# Patient Record
Sex: FEMALE | Race: WHITE | NOT HISPANIC OR LATINO | Employment: UNEMPLOYED | URBAN - METROPOLITAN AREA
[De-identification: names, ages, dates, MRNs, and addresses within clinical notes are randomized per-mention and may not be internally consistent; named-entity substitution may affect disease eponyms.]

---

## 2017-03-14 DIAGNOSIS — K76.89 AUTOIMMUNE DISEASE OF LIVER: ICD-10-CM

## 2017-03-14 RX ORDER — AZATHIOPRINE 50 MG/1
50 TABLET ORAL DAILY
Qty: 30 TABLET | Refills: 5 | Status: SHIPPED | OUTPATIENT
Start: 2017-03-14 | End: 2018-06-07 | Stop reason: SDUPTHER

## 2017-05-18 ENCOUNTER — TELEPHONE (OUTPATIENT)
Dept: INTERNAL MEDICINE | Facility: CLINIC | Age: 27
End: 2017-05-18

## 2017-05-18 NOTE — TELEPHONE ENCOUNTER
Left message advising pt that she still have refills remaining at [facility]'s pharmacy.

## 2017-05-18 NOTE — TELEPHONE ENCOUNTER
----- Message from Fabian Denis sent at 5/17/2017  4:01 PM CDT -----  Contact: Self [phone]  Type: Rx    Name of medication(s): azathioprine (IMURAN) 50 mg Tab     Is this a refill? New rx? Refill    Who prescribed medication?      Pharmacy Name, Phone, & Location:    Comments: Pt is requesting that you please send the Rx to Fax  [phone]    Nurses station #[phone], stated that if you need to get in touch with her to please call her @ the nurses station #, advice    Thanks

## 2017-05-25 ENCOUNTER — TELEPHONE (OUTPATIENT)
Dept: INTERNAL MEDICINE | Facility: CLINIC | Age: 27
End: 2017-05-25

## 2017-05-25 NOTE — TELEPHONE ENCOUNTER
pt has not seen me in more than a year - please set up office visit.  Also, she needs to see hepatology at least annually - have her call them to set up ov - does not need a referral form me as she has already seen them.    Re: blood tests: if these are labs her doc [place] wanted, yes I cna order. Otherwise may be better to see hepatology so all labs they want can be done at same time

## 2017-05-25 NOTE — TELEPHONE ENCOUNTER
----- Message from María Blanco MA sent at 5/25/2017  7:52 AM CDT -----  Contact: Amber kaur/[facility]-[phone]  Type: Orders Request    What orders/ testing are being requested? Labs (CBC, Electrolytes and Iron)    Is there a future appointment scheduled for the patient with PCP? No    When?    Comments: Please advise and call Amber. Thanks!

## 2018-06-07 DIAGNOSIS — K76.89 AUTOIMMUNE DISEASE OF LIVER: ICD-10-CM

## 2018-06-07 RX ORDER — AZATHIOPRINE 50 MG/1
TABLET ORAL
Qty: 30 TABLET | Refills: 0 | Status: SHIPPED | OUTPATIENT
Start: 2018-06-07 | End: 2022-08-26

## 2018-08-27 ENCOUNTER — HOSPITAL ENCOUNTER (EMERGENCY)
Facility: OTHER | Age: 28
Discharge: HOME | End: 2018-08-27
Attending: EMERGENCY MEDICINE
Payer: COMMERCIAL

## 2018-08-27 VITALS
WEIGHT: 123.44 LBS | OXYGEN SATURATION: 100 % | DIASTOLIC BLOOD PRESSURE: 81 MMHG | RESPIRATION RATE: 16 BRPM | BODY MASS INDEX: 19.84 KG/M2 | HEART RATE: 85 BPM | SYSTOLIC BLOOD PRESSURE: 119 MMHG | HEIGHT: 66 IN | TEMPERATURE: 98 F

## 2018-08-27 DIAGNOSIS — F10.10 ALCOHOL ABUSE: Primary | ICD-10-CM

## 2018-08-27 LAB
ALBUMIN SERPL BCP-MCNC: 4.1 G/DL
ALP SERPL-CCNC: 165 U/L
ALT SERPL W/O P-5'-P-CCNC: 143 U/L
ANION GAP SERPL CALC-SCNC: 13 MMOL/L
AST SERPL-CCNC: 327 U/L
B-HCG UR QL: NEGATIVE
BACTERIA #/AREA URNS HPF: NORMAL /HPF
BASOPHILS # BLD AUTO: 0.02 K/UL
BASOPHILS NFR BLD: 0.4 %
BILIRUB SERPL-MCNC: 0.5 MG/DL
BILIRUB UR QL STRIP: NEGATIVE
BUN SERPL-MCNC: 15 MG/DL
CALCIUM SERPL-MCNC: 9.7 MG/DL
CHLORIDE SERPL-SCNC: 102 MMOL/L
CLARITY UR: CLEAR
CO2 SERPL-SCNC: 28 MMOL/L
COLOR UR: YELLOW
CREAT SERPL-MCNC: 0.8 MG/DL
CTP QC/QA: YES
DIFFERENTIAL METHOD: ABNORMAL
EOSINOPHIL # BLD AUTO: 0 K/UL
EOSINOPHIL NFR BLD: 0.6 %
ERYTHROCYTE [DISTWIDTH] IN BLOOD BY AUTOMATED COUNT: 12.4 %
EST. GFR  (AFRICAN AMERICAN): >60 ML/MIN/1.73 M^2
EST. GFR  (NON AFRICAN AMERICAN): >60 ML/MIN/1.73 M^2
GLUCOSE SERPL-MCNC: 129 MG/DL
GLUCOSE UR QL STRIP: NEGATIVE
HCT VFR BLD AUTO: 38 %
HGB BLD-MCNC: 12.7 G/DL
HGB UR QL STRIP: ABNORMAL
HYALINE CASTS #/AREA URNS LPF: 1 /LPF
KETONES UR QL STRIP: NEGATIVE
LEUKOCYTE ESTERASE UR QL STRIP: NEGATIVE
LYMPHOCYTES # BLD AUTO: 1.9 K/UL
LYMPHOCYTES NFR BLD: 37.9 %
MCH RBC QN AUTO: 31.3 PG
MCHC RBC AUTO-ENTMCNC: 33.4 G/DL
MCV RBC AUTO: 94 FL
MICROSCOPIC COMMENT: NORMAL
MONOCYTES # BLD AUTO: 0.2 K/UL
MONOCYTES NFR BLD: 3.7 %
NEUTROPHILS # BLD AUTO: 2.9 K/UL
NEUTROPHILS NFR BLD: 57.2 %
NITRITE UR QL STRIP: NEGATIVE
PH UR STRIP: 6 [PH] (ref 5–8)
PLATELET # BLD AUTO: 113 K/UL
PMV BLD AUTO: 8.5 FL
POTASSIUM SERPL-SCNC: 4.1 MMOL/L
PROT SERPL-MCNC: 7.4 G/DL
PROT UR QL STRIP: NEGATIVE
RBC # BLD AUTO: 4.06 M/UL
RBC #/AREA URNS HPF: 1 /HPF (ref 0–4)
SODIUM SERPL-SCNC: 143 MMOL/L
SP GR UR STRIP: 1.01 (ref 1–1.03)
SQUAMOUS #/AREA URNS HPF: 2 /HPF
URN SPEC COLLECT METH UR: ABNORMAL
UROBILINOGEN UR STRIP-ACNC: NEGATIVE EU/DL
WBC # BLD AUTO: 5.09 K/UL

## 2018-08-27 PROCEDURE — 80053 COMPREHEN METABOLIC PANEL: CPT

## 2018-08-27 PROCEDURE — 96360 HYDRATION IV INFUSION INIT: CPT

## 2018-08-27 PROCEDURE — 81000 URINALYSIS NONAUTO W/SCOPE: CPT

## 2018-08-27 PROCEDURE — 85025 COMPLETE CBC W/AUTO DIFF WBC: CPT

## 2018-08-27 PROCEDURE — 25000003 PHARM REV CODE 250: Performed by: EMERGENCY MEDICINE

## 2018-08-27 PROCEDURE — 99283 EMERGENCY DEPT VISIT LOW MDM: CPT | Mod: 25

## 2018-08-27 PROCEDURE — 96361 HYDRATE IV INFUSION ADD-ON: CPT

## 2018-08-27 PROCEDURE — 81025 URINE PREGNANCY TEST: CPT | Performed by: EMERGENCY MEDICINE

## 2018-08-27 RX ADMIN — SODIUM CHLORIDE 1000 ML: 0.9 INJECTION, SOLUTION INTRAVENOUS at 01:08

## 2018-08-27 RX ADMIN — SODIUM CHLORIDE 1000 ML: 0.9 INJECTION, SOLUTION INTRAVENOUS at 02:08

## 2018-08-27 NOTE — ED TRIAGE NOTES
Pt presents with friend after drinking heavily for 3 days. Reports recent d/c from rehab for eating disorder and ETOH abuse. Pt reports she is just getting off a stretch of heavy drinking with a fall 4 days ago hitting head. Friend reports that pt has called suicide hotline 2 times and pt states it was just for support to talk to someone but she never had any SI or feeling of wanting to hurt herself. Pt reports she hasn't ate over the last few days.
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
## 2018-11-02 ENCOUNTER — HOSPITAL ENCOUNTER (EMERGENCY)
Facility: HOSPITAL | Age: 28
Discharge: HOME | End: 2018-11-02
Attending: EMERGENCY MEDICINE
Payer: COMMERCIAL

## 2018-11-02 VITALS
DIASTOLIC BLOOD PRESSURE: 71 MMHG | RESPIRATION RATE: 18 BRPM | OXYGEN SATURATION: 98 % | BODY MASS INDEX: 19.96 KG/M2 | HEART RATE: 98 BPM | HEIGHT: 67 IN | SYSTOLIC BLOOD PRESSURE: 111 MMHG | WEIGHT: 127.19 LBS | TEMPERATURE: 99 F

## 2018-11-02 DIAGNOSIS — F10.10 ALCOHOL ABUSE: Primary | ICD-10-CM

## 2018-11-02 LAB
ALBUMIN SERPL BCP-MCNC: 4.3 G/DL
ALP SERPL-CCNC: 264 U/L
ALT SERPL W/O P-5'-P-CCNC: 102 U/L
ANION GAP SERPL CALC-SCNC: 22 MMOL/L
AST SERPL-CCNC: 187 U/L
B-HCG UR QL: NEGATIVE
BASOPHILS # BLD AUTO: 0.05 K/UL
BASOPHILS NFR BLD: 0.5 %
BILIRUB SERPL-MCNC: 1.6 MG/DL
BUN SERPL-MCNC: 17 MG/DL
CALCIUM SERPL-MCNC: 9.9 MG/DL
CHLORIDE SERPL-SCNC: 88 MMOL/L
CO2 SERPL-SCNC: 25 MMOL/L
CREAT SERPL-MCNC: 0.7 MG/DL
CTP QC/QA: YES
DIFFERENTIAL METHOD: ABNORMAL
EOSINOPHIL # BLD AUTO: 0.1 K/UL
EOSINOPHIL NFR BLD: 0.8 %
ERYTHROCYTE [DISTWIDTH] IN BLOOD BY AUTOMATED COUNT: 15.5 %
EST. GFR  (AFRICAN AMERICAN): >60 ML/MIN/1.73 M^2
EST. GFR  (NON AFRICAN AMERICAN): >60 ML/MIN/1.73 M^2
ETHANOL SERPL-MCNC: 110 MG/DL
GLUCOSE SERPL-MCNC: 51 MG/DL
HCT VFR BLD AUTO: 36.1 %
HGB BLD-MCNC: 12 G/DL
IMM GRANULOCYTES # BLD AUTO: 0.06 K/UL
IMM GRANULOCYTES NFR BLD AUTO: 0.7 %
INR PPP: 1
LYMPHOCYTES # BLD AUTO: 1.6 K/UL
LYMPHOCYTES NFR BLD: 17 %
MCH RBC QN AUTO: 30.8 PG
MCHC RBC AUTO-ENTMCNC: 33.2 G/DL
MCV RBC AUTO: 93 FL
MONOCYTES # BLD AUTO: 0.6 K/UL
MONOCYTES NFR BLD: 7 %
NEUTROPHILS # BLD AUTO: 6.8 K/UL
NEUTROPHILS NFR BLD: 74 %
NRBC BLD-RTO: 0 /100 WBC
PLATELET # BLD AUTO: 145 K/UL
PMV BLD AUTO: 9.8 FL
POTASSIUM SERPL-SCNC: 2.8 MMOL/L
PROT SERPL-MCNC: 7.9 G/DL
PROTHROMBIN TIME: 10.1 SEC
RBC # BLD AUTO: 3.9 M/UL
SODIUM SERPL-SCNC: 135 MMOL/L
WBC # BLD AUTO: 9.17 K/UL

## 2018-11-02 PROCEDURE — 80320 DRUG SCREEN QUANTALCOHOLS: CPT

## 2018-11-02 PROCEDURE — 99284 EMERGENCY DEPT VISIT MOD MDM: CPT | Mod: ,,, | Performed by: EMERGENCY MEDICINE

## 2018-11-02 PROCEDURE — 99284 EMERGENCY DEPT VISIT MOD MDM: CPT | Mod: 25

## 2018-11-02 PROCEDURE — 93010 ELECTROCARDIOGRAM REPORT: CPT | Mod: ,,, | Performed by: INTERNAL MEDICINE

## 2018-11-02 PROCEDURE — 80053 COMPREHEN METABOLIC PANEL: CPT

## 2018-11-02 PROCEDURE — 85025 COMPLETE CBC W/AUTO DIFF WBC: CPT

## 2018-11-02 PROCEDURE — 85610 PROTHROMBIN TIME: CPT

## 2018-11-02 PROCEDURE — 25000003 PHARM REV CODE 250: Performed by: EMERGENCY MEDICINE

## 2018-11-02 PROCEDURE — 96360 HYDRATION IV INFUSION INIT: CPT

## 2018-11-02 PROCEDURE — 96361 HYDRATE IV INFUSION ADD-ON: CPT

## 2018-11-02 PROCEDURE — 81025 URINE PREGNANCY TEST: CPT | Performed by: EMERGENCY MEDICINE

## 2018-11-02 PROCEDURE — 93005 ELECTROCARDIOGRAM TRACING: CPT

## 2018-11-02 RX ORDER — GABAPENTIN 300 MG/1
300 CAPSULE ORAL 3 TIMES DAILY
COMMUNITY
End: 2022-08-25

## 2018-11-02 RX ORDER — POTASSIUM CHLORIDE 20 MEQ/1
40 TABLET, EXTENDED RELEASE ORAL
Status: COMPLETED | OUTPATIENT
Start: 2018-11-02 | End: 2018-11-02

## 2018-11-02 RX ORDER — SODIUM CHLORIDE 9 MG/ML
1000 INJECTION, SOLUTION INTRAVENOUS
Status: COMPLETED | OUTPATIENT
Start: 2018-11-02 | End: 2018-11-02

## 2018-11-02 RX ORDER — POTASSIUM CHLORIDE 20 MEQ/15ML
40 SOLUTION ORAL
Status: DISCONTINUED | OUTPATIENT
Start: 2018-11-02 | End: 2018-11-02

## 2018-11-02 RX ADMIN — POTASSIUM CHLORIDE 40 MEQ: 1500 TABLET, EXTENDED RELEASE ORAL at 03:11

## 2018-11-02 RX ADMIN — SODIUM CHLORIDE 1000 ML: 0.9 INJECTION, SOLUTION INTRAVENOUS at 02:11

## 2018-11-02 RX ADMIN — POTASSIUM CHLORIDE 40 MEQ: 1500 TABLET, EXTENDED RELEASE ORAL at 04:11

## 2018-11-02 RX ADMIN — SODIUM CHLORIDE 1000 ML: 0.9 INJECTION, SOLUTION INTRAVENOUS at 03:11

## 2018-11-02 NOTE — DISCHARGE INSTRUCTIONS
Follow up with your doctor and your liver doctor. Call Monday for an appointment.  Return to ED for vomiting, abdominal pain or any other concerns.

## 2018-11-02 NOTE — ED TRIAGE NOTES
"Pt. Presents to ED today with c/o possible withdrawal from alcohol. Usually drinks 1-1.5 pints of whiskey daily, ran out of alcohol last night, last drink around 2100. Pt. Denies going through withdrawals before. Pt. States feeling like "she's going to pass out", states n/v, subjective tremors. Denies cp, sob, fevers. No other acute distress noted at this time.   "

## 2018-11-02 NOTE — ED NOTES
Pt. Denies questions or concerns regarding discharge instructions or fu. No acute distress noted. Ambulatory with steady gait to lobby with family.

## 2018-11-02 NOTE — ED PROVIDER NOTES
"Encounter Date: 11/2/2018    SCRIBE #1 NOTE: I, Maryellen Boateng, am scribing for, and in the presence of,  Dr. Choi. I have scribed the entire note.       History     Chief Complaint   Patient presents with    Alcohol Problem     Pt states she is an alcoholic who fell off the wagon yesterday. Her last drink was yesterday morning at approximately 1000 and she feels like she is going into DT's.      Time patient was seen by the provider: 2:09 PM      The patient is a 28 y.o. female with co-morbidities including: auto immune neutropenia, autoimmune hepatitis, anxiety, depression, who presents to the ED with a complaint of alcohol abuse. The patient reports she "fell off the wagon" yesterday. She states she drinks about 1 pint of whiskey. Her last drink was around 10:00 PM last night. She notes of SOB, nausea. Pt has been in and out of rehab for the past year. She also reports of having multiple trip and falls while intoxicated. Denies chest pain, vomiting, diarrhea.   Pt denies suicidal thoughts/ideations.      The history is provided by the patient, medical records and a parent.     Review of patient's allergies indicates:  No Known Allergies  Past Medical History:   Diagnosis Date    Anxiety     Auto immune neutropenia     Autoimmune hepatitis     Depression      No past surgical history on file.  Family History   Problem Relation Age of Onset    Depression Mother     Hyperlipidemia Mother     Alcohol abuse Father     Cancer Father     Depression Father     Depression Sister     Depression Brother     Cancer Maternal Aunt     Depression Maternal Grandfather     Liver disease Maternal Grandfather     ALS Maternal Grandfather     Depression Paternal Grandmother     Cancer Paternal Grandfather     Depression Paternal Grandfather     Depression Brother      Social History     Tobacco Use    Smoking status: Current Some Day Smoker   Substance Use Topics    Alcohol use: Yes    Drug use: Yes     " Types: Marijuana     Review of Systems   Constitutional: Negative for fever.   HENT: Negative for sore throat.    Respiratory: Positive for shortness of breath.    Cardiovascular: Negative for chest pain.   Gastrointestinal: Positive for nausea. Negative for diarrhea and vomiting.   Genitourinary: Negative for dysuria.   Musculoskeletal: Negative for back pain.   Skin: Negative for rash.   Neurological: Negative for weakness.   Hematological: Does not bruise/bleed easily.   Psychiatric/Behavioral: Negative for self-injury and suicidal ideas.       Physical Exam     Initial Vitals [11/02/18 1329]   BP Pulse Resp Temp SpO2   126/70 (!) 119 19 98.7 °F (37.1 °C) 98 %      MAP       --         Physical Exam    Nursing note and vitals reviewed.  Constitutional: She appears well-developed and well-nourished. No distress.   HENT:   Head: Normocephalic and atraumatic.   Eyes: EOM are normal. Pupils are equal, round, and reactive to light.   Neck: Neck supple.   Cardiovascular: Normal rate, regular rhythm, normal heart sounds and intact distal pulses.   Pulmonary/Chest: Breath sounds normal. No respiratory distress.   Abdominal: Soft. Bowel sounds are normal. She exhibits no distension. There is no tenderness. There is no rebound and no guarding.   Musculoskeletal: She exhibits no edema or tenderness.   Neurological: She is alert and oriented to person, place, and time.   Skin: Skin is warm and dry.   Scattered bruises on the right forearm. Bruise to the left hip.   Psychiatric: She has a normal mood and affect.         ED Course   Procedures  Labs Reviewed   CBC W/ AUTO DIFFERENTIAL - Abnormal; Notable for the following components:       Result Value    RBC 3.90 (*)     Hematocrit 36.1 (*)     RDW 15.5 (*)     Platelets 145 (*)     Immature Granulocytes 0.7 (*)     Immature Grans (Abs) 0.06 (*)     Gran% 74.0 (*)     Lymph% 17.0 (*)     All other components within normal limits    Narrative:     ONE GREEN SHARED    COMPREHENSIVE METABOLIC PANEL - Abnormal; Notable for the following components:    Sodium 135 (*)     Potassium 2.8 (*)     Chloride 88 (*)     Glucose 51 (*)     Total Bilirubin 1.6 (*)     Alkaline Phosphatase 264 (*)      (*)      (*)     Anion Gap 22 (*)     All other components within normal limits    Narrative:     ONE GREEN SHARED   ALCOHOL,MEDICAL (ETHANOL) - Abnormal; Notable for the following components:    Alcohol, Medical, Serum 110 (*)     All other components within normal limits    Narrative:     ONE GREEN SHARED   PROTIME-INR    Narrative:     ONE GREEN SHARED   POCT URINE PREGNANCY     EKG Readings: (Independently Interpreted)   Initial Reading: No STEMI. Rhythm: Normal Sinus Rhythm. Heart Rate: 90.   No acute ischemic changes.       Imaging Results    None          Medical Decision Making:   History:   Old Medical Records: I decided to obtain old medical records.  Initial Assessment:   29 y/o F presents with concern for alcohol withdrawal and worsening liver function- admits to non-compliance with autoimmune hepatitis liver disease.  Pt has history of eating disorder and self harm. Pt denies suicidal thoughts. She is here with her father whom has a plan to take her to rehabd from ED. Will hydrate and check blood work. No clinical signs of alcohol at this time.  Independently Interpreted Test(s):   I have ordered and independently interpreted EKG Reading(s) - see prior notes  Clinical Tests:   Lab Tests: Ordered and Reviewed  Medical Tests: Ordered and Reviewed            Scribe Attestation:   Scribe #1: I performed the above scribed service and the documentation accurately describes the services I performed. I attest to the accuracy of the note.    Attending Attestation:             Attending ED Notes:   4:04 PM  EtOH 110  Slight increase in lfts. No indication for emergent admission. No clinical signs of withdrawal. Pt and father provided with numbers for alcohol abuse resources as  well as number for [facility] which father states he is familiar with. Discharge home. Follow up with PCP as well as hepatology.             Clinical Impression:   The encounter diagnosis was Alcohol abuse.      Disposition:   Disposition: Discharged  Condition: Stable                        Swati Choi MD  11/03/18 0053

## 2022-08-04 ENCOUNTER — TELEPHONE (OUTPATIENT)
Dept: TRANSPLANT | Facility: CLINIC | Age: 32
End: 2022-08-04
Payer: COMMERCIAL

## 2022-08-04 LAB — HIV 1+2 AB+HIV1 P24 AG SERPL QL IA: NORMAL

## 2022-08-04 NOTE — TELEPHONE ENCOUNTER
Pt records reviewed.  Pt will be referred to Hepatology due to or autoimmune hepatitis treated with steroids.  Pt seen in 2018   Initial referral received  from Dr. Crispin Nelson   Referral letter sent to provider and patient.      RECORDS SCANNED IN MEDIA UNDER HEPATOLOGY REFERRAL .

## 2022-08-04 NOTE — LETTER
August 4, 2022    Eliseo Ybarra  [address] [address]      Dear Eliseo Ybarra:    Your doctor has referred you to the [facility]. We are sending this letter to remind you to make an appointment with us to complete the referral process.     Please call us at [phone] to schedule an appointment. We look forward to seeing you soon.     If you received a call and have been scheduled, please disregard this letter.       Sincerely,        [facility]   [address] [place] [address]  [phone]

## 2022-08-04 NOTE — TELEPHONE ENCOUNTER
----- Message from Kelsie Trotter RN sent at 8/4/2022  4:12 PM CDT -----  Would this be for you?  ----- Message -----  From: Katarzyna Elise  Sent: 8/4/2022   3:19 PM CDT  To: [facility] Referral Pool    Good afternoon,    The pt listed above is being referred by Crispin Nelson for autoimmune hepatitis treated with steroids. I have scanned the referral and records into . Please contact Miss Ybarra at your earliest convenience to schedule her appt.            Thank You,  Katarzyna Hull

## 2022-08-05 ENCOUNTER — TELEPHONE (OUTPATIENT)
Dept: HEPATOLOGY | Facility: CLINIC | Age: 32
End: 2022-08-05
Payer: COMMERCIAL

## 2022-08-08 ENCOUNTER — TELEPHONE (OUTPATIENT)
Dept: HEPATOLOGY | Facility: CLINIC | Age: 32
End: 2022-08-08
Payer: COMMERCIAL

## 2022-08-25 ENCOUNTER — LAB VISIT (OUTPATIENT)
Dept: LAB | Facility: HOSPITAL | Age: 32
End: 2022-08-25
Payer: COMMERCIAL

## 2022-08-25 ENCOUNTER — OFFICE VISIT (OUTPATIENT)
Dept: HEPATOLOGY | Facility: CLINIC | Age: 32
End: 2022-08-25
Payer: COMMERCIAL

## 2022-08-25 VITALS
TEMPERATURE: 98 F | HEIGHT: 67 IN | SYSTOLIC BLOOD PRESSURE: 100 MMHG | HEART RATE: 91 BPM | DIASTOLIC BLOOD PRESSURE: 62 MMHG | OXYGEN SATURATION: 97 % | RESPIRATION RATE: 18 BRPM | BODY MASS INDEX: 19.92 KG/M2

## 2022-08-25 DIAGNOSIS — Z79.60 LONG-TERM USE OF IMMUNOSUPPRESSANT MEDICATION: ICD-10-CM

## 2022-08-25 DIAGNOSIS — K75.4 AUTOIMMUNE HEPATITIS: ICD-10-CM

## 2022-08-25 DIAGNOSIS — K75.4 AUTOIMMUNE HEPATITIS: Primary | ICD-10-CM

## 2022-08-25 LAB
AFP SERPL-MCNC: 3.1 NG/ML (ref 0–8.4)
ALBUMIN SERPL BCP-MCNC: 4.2 G/DL (ref 3.5–5.2)
ALP SERPL-CCNC: 266 U/L (ref 55–135)
ALT SERPL W/O P-5'-P-CCNC: 110 U/L (ref 10–44)
ANION GAP SERPL CALC-SCNC: 10 MMOL/L (ref 8–16)
AST SERPL-CCNC: 99 U/L (ref 10–40)
BASOPHILS # BLD AUTO: 0.06 K/UL (ref 0–0.2)
BASOPHILS NFR BLD: 0.9 % (ref 0–1.9)
BILIRUB SERPL-MCNC: 0.4 MG/DL (ref 0.1–1)
BUN SERPL-MCNC: 13 MG/DL (ref 6–20)
CALCIUM SERPL-MCNC: 9.8 MG/DL (ref 8.7–10.5)
CHLORIDE SERPL-SCNC: 103 MMOL/L (ref 95–110)
CO2 SERPL-SCNC: 26 MMOL/L (ref 23–29)
CREAT SERPL-MCNC: 1 MG/DL (ref 0.5–1.4)
DIFFERENTIAL METHOD: ABNORMAL
EOSINOPHIL # BLD AUTO: 0.6 K/UL (ref 0–0.5)
EOSINOPHIL NFR BLD: 8.1 % (ref 0–8)
ERYTHROCYTE [DISTWIDTH] IN BLOOD BY AUTOMATED COUNT: 13.4 % (ref 11.5–14.5)
EST. GFR  (NO RACE VARIABLE): >60 ML/MIN/1.73 M^2
GLUCOSE SERPL-MCNC: 127 MG/DL (ref 70–110)
HCT VFR BLD AUTO: 35.8 % (ref 37–48.5)
HGB BLD-MCNC: 12 G/DL (ref 12–16)
IGG SERPL-MCNC: 1056 MG/DL (ref 650–1600)
IMM GRANULOCYTES # BLD AUTO: 0.03 K/UL (ref 0–0.04)
IMM GRANULOCYTES NFR BLD AUTO: 0.4 % (ref 0–0.5)
INR PPP: 1 (ref 0.8–1.2)
LYMPHOCYTES # BLD AUTO: 1 K/UL (ref 1–4.8)
LYMPHOCYTES NFR BLD: 15.1 % (ref 18–48)
MCH RBC QN AUTO: 30.7 PG (ref 27–31)
MCHC RBC AUTO-ENTMCNC: 33.5 G/DL (ref 32–36)
MCV RBC AUTO: 92 FL (ref 82–98)
MONOCYTES # BLD AUTO: 0.4 K/UL (ref 0.3–1)
MONOCYTES NFR BLD: 5.9 % (ref 4–15)
NEUTROPHILS # BLD AUTO: 4.7 K/UL (ref 1.8–7.7)
NEUTROPHILS NFR BLD: 69.6 % (ref 38–73)
NRBC BLD-RTO: 0 /100 WBC
PLATELET # BLD AUTO: 157 K/UL (ref 150–450)
PMV BLD AUTO: 10.3 FL (ref 9.2–12.9)
POTASSIUM SERPL-SCNC: 4.5 MMOL/L (ref 3.5–5.1)
PROT SERPL-MCNC: 7.7 G/DL (ref 6–8.4)
PROTHROMBIN TIME: 10.8 SEC (ref 9–12.5)
RBC # BLD AUTO: 3.91 M/UL (ref 4–5.4)
SODIUM SERPL-SCNC: 139 MMOL/L (ref 136–145)
WBC # BLD AUTO: 6.77 K/UL (ref 3.9–12.7)

## 2022-08-25 PROCEDURE — 86256 FLUORESCENT ANTIBODY TITER: CPT | Performed by: NURSE PRACTITIONER

## 2022-08-25 PROCEDURE — 1160F PR REVIEW ALL MEDS BY PRESCRIBER/CLIN PHARMACIST DOCUMENTED: ICD-10-PCS | Mod: CPTII,S$GLB,, | Performed by: NURSE PRACTITIONER

## 2022-08-25 PROCEDURE — 80053 COMPREHEN METABOLIC PANEL: CPT | Performed by: NURSE PRACTITIONER

## 2022-08-25 PROCEDURE — 1160F RVW MEDS BY RX/DR IN RCRD: CPT | Mod: CPTII,S$GLB,, | Performed by: NURSE PRACTITIONER

## 2022-08-25 PROCEDURE — 99204 OFFICE O/P NEW MOD 45 MIN: CPT | Mod: S$GLB,,, | Performed by: NURSE PRACTITIONER

## 2022-08-25 PROCEDURE — 85610 PROTHROMBIN TIME: CPT | Performed by: NURSE PRACTITIONER

## 2022-08-25 PROCEDURE — 3074F SYST BP LT 130 MM HG: CPT | Mod: CPTII,S$GLB,, | Performed by: NURSE PRACTITIONER

## 2022-08-25 PROCEDURE — 1159F MED LIST DOCD IN RCRD: CPT | Mod: CPTII,S$GLB,, | Performed by: NURSE PRACTITIONER

## 2022-08-25 PROCEDURE — 3008F PR BODY MASS INDEX (BMI) DOCUMENTED: ICD-10-PCS | Mod: CPTII,S$GLB,, | Performed by: NURSE PRACTITIONER

## 2022-08-25 PROCEDURE — 82105 ALPHA-FETOPROTEIN SERUM: CPT | Performed by: NURSE PRACTITIONER

## 2022-08-25 PROCEDURE — 3078F DIAST BP <80 MM HG: CPT | Mod: CPTII,S$GLB,, | Performed by: NURSE PRACTITIONER

## 2022-08-25 PROCEDURE — 99204 PR OFFICE/OUTPT VISIT, NEW, LEVL IV, 45-59 MIN: ICD-10-PCS | Mod: S$GLB,,, | Performed by: NURSE PRACTITIONER

## 2022-08-25 PROCEDURE — 86706 HEP B SURFACE ANTIBODY: CPT | Performed by: NURSE PRACTITIONER

## 2022-08-25 PROCEDURE — 86038 ANTINUCLEAR ANTIBODIES: CPT | Performed by: NURSE PRACTITIONER

## 2022-08-25 PROCEDURE — 3078F PR MOST RECENT DIASTOLIC BLOOD PRESSURE < 80 MM HG: ICD-10-PCS | Mod: CPTII,S$GLB,, | Performed by: NURSE PRACTITIONER

## 2022-08-25 PROCEDURE — 86704 HEP B CORE ANTIBODY TOTAL: CPT | Performed by: NURSE PRACTITIONER

## 2022-08-25 PROCEDURE — 99999 PR PBB SHADOW E&M-EST. PATIENT-LVL IV: CPT | Mod: PBBFAC,,, | Performed by: NURSE PRACTITIONER

## 2022-08-25 PROCEDURE — 82784 ASSAY IGA/IGD/IGG/IGM EACH: CPT | Performed by: NURSE PRACTITIONER

## 2022-08-25 PROCEDURE — 3074F PR MOST RECENT SYSTOLIC BLOOD PRESSURE < 130 MM HG: ICD-10-PCS | Mod: CPTII,S$GLB,, | Performed by: NURSE PRACTITIONER

## 2022-08-25 PROCEDURE — 80321 ALCOHOLS BIOMARKERS 1OR 2: CPT | Performed by: NURSE PRACTITIONER

## 2022-08-25 PROCEDURE — 3008F BODY MASS INDEX DOCD: CPT | Mod: CPTII,S$GLB,, | Performed by: NURSE PRACTITIONER

## 2022-08-25 PROCEDURE — 99999 PR PBB SHADOW E&M-EST. PATIENT-LVL IV: ICD-10-PCS | Mod: PBBFAC,,, | Performed by: NURSE PRACTITIONER

## 2022-08-25 PROCEDURE — 1159F PR MEDICATION LIST DOCUMENTED IN MEDICAL RECORD: ICD-10-PCS | Mod: CPTII,S$GLB,, | Performed by: NURSE PRACTITIONER

## 2022-08-25 PROCEDURE — 86790 VIRUS ANTIBODY NOS: CPT | Performed by: NURSE PRACTITIONER

## 2022-08-25 PROCEDURE — 85025 COMPLETE CBC W/AUTO DIFF WBC: CPT | Performed by: NURSE PRACTITIONER

## 2022-08-25 RX ORDER — TRIAMCINOLONE ACETONIDE 1 MG/G
CREAM TOPICAL
COMMUNITY
Start: 2022-08-24 | End: 2023-11-16

## 2022-08-25 RX ORDER — BUPROPION HYDROCHLORIDE 300 MG/1
300 TABLET ORAL DAILY
COMMUNITY

## 2022-08-25 RX ORDER — ESCITALOPRAM OXALATE 10 MG/1
10 TABLET ORAL DAILY
COMMUNITY
End: 2022-09-22 | Stop reason: ALTCHOICE

## 2022-08-25 RX ORDER — BUDESONIDE 3 MG/1
3 CAPSULE, COATED PELLETS ORAL DAILY
COMMUNITY
End: 2023-03-14 | Stop reason: SDUPTHER

## 2022-08-25 NOTE — PROGRESS NOTES
[facility] VISIT NEW PT NOTE    REFERRING PROVIDER:  Aaareferral Self    CHIEF COMPLAINT: Autoimmune Hepatitis    HPI: This is a 32 y.o. White female with PMH noted below, presenting for evaluation of autoimmune hepatitis. She was seen once in clinic by Angela Patterson NP in 3/2016. She recently moved back to [place], from [place] (previously followed by GI/Hepatologist Dr. Nicola Bergeron), and is seeking to re-establish care.     She was diagnosed with autoimmune hepatitis at age 7, previously treated with Azathioprine and steroids. She is now on low dose Budesonide, in addition to Azathioprine. There are no recent labs on file for review. Last liver biopsy was performed in  (no evidence of significant fibrosis, per patient report). Family history is significant for Father with a history of alcohol abuse. She also has a history of alcohol abuse, and illicit drug use; sober since 2018. She denies current illicit drug use. HCV negative on prior labs in . She is well appearing, and denies any signs or symptoms of hepatic decompensation including jaundice, dark urine, abdominal distention, lower extremity, hematemesis, melena, or periods of confusion suggestive of hepatic encephalopathy. No abnormal skin rashes, or generalized joint pain.     Review of patient's allergies indicates:  No Known Allergies    Current Outpatient Medications on File Prior to Visit   Medication Sig Dispense Refill    budesonide (ENTOCORT EC) 3 mg capsule Take 3 mg by mouth once daily.      buPROPion (WELLBUTRIN XL) 300 MG 24 hr tablet Take 300 mg by mouth once daily.      EScitalopram oxalate (LEXAPRO) 10 MG tablet Take 10 mg by mouth once daily.      azaTHIOprine (IMURAN) 50 mg Tab TAKE 1 TABLET(50 MG) BY MOUTH EVERY DAY 30 tablet 0    triamcinolone acetonide 0.1% (KENALOG) 0.1 % cream SMARTSI Application Topical 2-3 Times Daily      [DISCONTINUED] fluoxetine (PROZAC) 20 MG capsule Take 20 mg by  "mouth 4 (four) times daily as needed.      [DISCONTINUED] gabapentin (NEURONTIN) 300 MG capsule Take 300 mg by mouth 3 (three) times daily.      [DISCONTINUED] predniSONE (DELTASONE) 5 MG tablet Take 1 tablet (5 mg total) by mouth once daily. 30 tablet 3    [DISCONTINUED] risperidone (RISPERDAL) 1 MG tablet Take 1 mg by mouth once daily.       No current facility-administered medications on file prior to visit.     PMHX:  has a past medical history of Anxiety, Auto immune neutropenia, Autoimmune hepatitis, and Depression.    PSHX:  has no past surgical history on file.    FAMILY HISTORY: Negative for liver disease, reviewed in [facility]    SOCIAL HISTORY:   Social History     Tobacco Use   Smoking Status Current Some Day Smoker   Smokeless Tobacco Not on file     Social History     Substance and Sexual Activity   Alcohol Use Yes     Social History     Substance and Sexual Activity   Drug Use Yes    Types: Marijuana     ROS:   GENERAL: Denies fever, chills, weight loss/gain, fatigue  HEENT: Denies headaches, dizziness, vision/hearing changes  CARDIOVASCULAR: Denies chest pain, palpitations, or edema  RESPIRATORY: Denies dyspnea, cough  GI: Denies abdominal pain, rectal bleeding, nausea, vomiting. No change in bowel pattern or color  : Denies dysuria, hematuria   SKIN: Denies rash, itching   NEURO: Denies confusion, memory loss, or mood changes  PSYCH: Denies depression or anxiety  HEME/LYMPH: Denies easy bruising or bleeding    PHYSICAL EXAM:   Friendly White female, in no acute distress; alert and oriented to person, place and time.  VITALS: /62 (BP Location: Right arm, Patient Position: Sitting, BP Method: Medium (Manual))   Pulse 91   Temp 98.3 °F (36.8 °C)   Resp 18   Ht 5' 7" (1.702 m)   SpO2 97%   BMI 19.92 kg/m²   HENT: Normocephalic, without obvious abnormality.   EYES: Sclerae anicteric.   NECK: No obvious masses.  CARDIOVASCULAR: Regular rate. No peripheral edema.  RESPIRATORY: Normal " respiratory effort.  GI: Soft, non-tender, non-distended. No hepatosplenomegaly. No masses palpable. No obvious ascites.  EXTREMITIES:  No clubbing, cyanosis or edema.  SKIN: Warm and dry. No jaundice. No rashes noted to exposed skin. No palmar erythema. + telangectasias noted to chest wall.  NEURO:  Normal gait. No asterixis.  PSYCH:  Memory intact. Thought and speech pattern appropriate. Behavior normal. No depression or anxiety noted.    DIAGNOSTIC STUDIES:    US Abdomen Limited  Narrative: Ultrasound abdomen limited.    Findings: The visualized portion of the pancreas is unremarkable.  The liver measures 14.8 cm and is unremarkable.  The gallbladder is unremarkable.  The right kidney measures 10.4 cm and is unremarkable.  The common duct is 0.5 cm.  Impression:  No acute findings.    Electronically signed by: REAL GROVER MD  Date:     05/27/15  Time:    12:04     FIBROSCAN - Ordered at visit.    ASSESSMENT & PLAN:  32 y.o. White female with:      1. Autoimmune hepatitis  FibroScan (Vibration Controlled Transient Elastography)    IgG    Phosphatidylethanol (PETH)    US Abdomen Complete    Comprehensive Metabolic Panel    CBC Auto Differential    Protime-INR    NORA Screen w/Reflex    AFP Tumor Marker    Anti-Smooth Muscle Antibody    Hepatitis A antibody, IgG    Hepatitis B Core Antibody, Total    Hepatitis B Surface Antibody, Qual/Quant   2. Long-term use of immunosuppressant medication  FibroScan (Vibration Controlled Transient Elastography)    IgG    Phosphatidylethanol (PETH)    US Abdomen Complete    Comprehensive Metabolic Panel    CBC Auto Differential    Protime-INR    NORA Screen w/Reflex    AFP Tumor Marker    Anti-Smooth Muscle Antibody    Hepatitis A antibody, IgG    Hepatitis B Core Antibody, Total    Hepatitis B Surface Antibody, Qual/Quant     - Schedule Fibroscan to re-stage liver disease.  - Schedule abdominal ultrasound to evaluate the liver, bile ducts, gallbladder and spleen.   - Continue  Azathioprine 50 mg PO daily and Budesonide 3 mg daily for now.   - Labs today to monitor liver function.  - Will also check titer levels for Hepatitis A and B, and arrange for vaccination if clinically warranted.  - Continue to remain abstinent from alcohol and illicit drugs.   - Obtain outside records from GI/Hepatology provider in [place].     Follow up in about 4 weeks (around 9/22/2022). with labs, US and Fibroscan before visit.    Thank you for allowing me to participate in the care of Eliseo Ybarra       Hepatology Nurse Practitioner  [facility]  8/25/2022 @ 1520    CC'ed note to:   Self, Aaareferral  Stevie Seaman MD

## 2022-08-25 NOTE — PATIENT INSTRUCTIONS
- Schedule Fibroscan to re-stage liver disease.  - Schedule abdominal ultrasound to evaluate the liver, bile ducts, gallbladder and spleen.   - Continue Azathioprine 50 mg PO daily and Budesonide 3 mg daily for now.   - Labs today to monitor liver function.  - Will also check titer levels for Hepatitis A and B, and arrange for vaccination if clinically warranted.  - Continue to remain abstinent from alcohol and illicit drugs.   - Obtain outside records from GI/Hepatology provider in [place].

## 2022-08-26 DIAGNOSIS — K75.4 AUTOIMMUNE HEPATITIS: Primary | ICD-10-CM

## 2022-08-26 LAB
ANA SER QL IF: NORMAL
HAV IGG SER QL IA: POSITIVE
HBV CORE AB SERPL QL IA: NEGATIVE
SMOOTH MUSCLE AB TITR SER IF: NORMAL {TITER}

## 2022-08-26 RX ORDER — AZATHIOPRINE 50 MG/1
100 TABLET ORAL DAILY
Qty: 60 TABLET | Refills: 11 | Status: SHIPPED | OUTPATIENT
Start: 2022-08-26 | End: 2023-03-13 | Stop reason: SDUPTHER

## 2022-08-28 LAB
PETH 16:0/18.1 (POPETH): <10 NG/ML
PETH 16:0/18.2 (PLPETH): <10 NG/ML

## 2022-08-29 LAB
HBV SURFACE AB SER QL IA: POSITIVE
HBV SURFACE AB SERPL IA-ACNC: 28 MIU/ML

## 2022-09-06 ENCOUNTER — PROCEDURE VISIT (OUTPATIENT)
Dept: HEPATOLOGY | Facility: CLINIC | Age: 32
End: 2022-09-06
Payer: COMMERCIAL

## 2022-09-06 ENCOUNTER — HOSPITAL ENCOUNTER (OUTPATIENT)
Dept: RADIOLOGY | Facility: HOSPITAL | Age: 32
Discharge: HOME | End: 2022-09-06
Attending: NURSE PRACTITIONER
Payer: COMMERCIAL

## 2022-09-06 DIAGNOSIS — K75.4 AUTOIMMUNE HEPATITIS: ICD-10-CM

## 2022-09-06 DIAGNOSIS — Z79.60 LONG-TERM USE OF IMMUNOSUPPRESSANT MEDICATION: ICD-10-CM

## 2022-09-06 PROCEDURE — 91200 FIBROSCAN (VIBRATION CONTROLLED TRANSIENT ELASTOGRAPHY): ICD-10-PCS | Mod: S$GLB,,, | Performed by: NURSE PRACTITIONER

## 2022-09-06 PROCEDURE — 76700 US EXAM ABDOM COMPLETE: CPT | Mod: 26,,, | Performed by: RADIOLOGY

## 2022-09-06 PROCEDURE — 91200 LIVER ELASTOGRAPHY: CPT | Mod: S$GLB,,, | Performed by: NURSE PRACTITIONER

## 2022-09-06 PROCEDURE — 76700 US ABDOMEN COMPLETE: ICD-10-PCS | Mod: 26,,, | Performed by: RADIOLOGY

## 2022-09-06 PROCEDURE — 76700 US EXAM ABDOM COMPLETE: CPT | Mod: TC

## 2022-09-07 NOTE — PROCEDURES
FibroScan (Vibration Controlled Transient Elastography)    Date/Time: 9/6/2022 10:15 AM  Performed by: Kelly Claire NP  Authorized by: Kelly Claire NP     Diagnosis:  Other and Alcohol    Probe:  M    Universal Protocol: Patient's identity, procedure and site were verified, confirmatory pause was performed.  Discussed procedure including risks and potential complications.  Questions answered.  Patient verbalizes understanding and wishes to proceed with VCTE.     Procedure: After providing explanations of the procedure, patient was placed in the supine position with right arm in maximum abduction to allow optimal exposure of right lateral abdomen.  Patient was briefly assessed, Testing was performed in the mid-axillary location, 50Hz Shear Wave pulses were applied and the resulting Shear Wave and Propagation Speed detected with a 3.5 MHz ultrasonic signal, using the FibroScan probe, Skin to liver capsule distance and liver parenchyma were accessed during the entire examination with the FibroScan probe, Patient was instructed to breathe normally and to abstain from sudden movements during the procedure, allowing for random measurements of liver stiffness. At least 10 Shear Waves were produced, Individual measurements of each Shear Wave were calculated.  Patient tolerated the procedure well with no complications.  Meets discharge criteria as was dismissed.  Rates pain 0 out of 10.  Patient will follow up with ordering provider to review results.    Findings  Median liver stiffness score:  5.3  CAP Reading: dB/m:  234    IQR/med %:  8  Interpretation  Fibrosis interpretation is based on medial liver stiffness - Kilopascal (kPa).    Fibrosis Stage:  F 0-1  Steatosis interpretation is based on controlled attenuation parameter - (dB/m).    Steatosis Grade:  S1

## 2022-09-14 ENCOUNTER — OFFICE VISIT (OUTPATIENT)
Dept: OBSTETRICS AND GYNECOLOGY | Facility: CLINIC | Age: 32
End: 2022-09-14
Payer: COMMERCIAL

## 2022-09-14 VITALS — BODY MASS INDEX: 19.92 KG/M2 | DIASTOLIC BLOOD PRESSURE: 64 MMHG | SYSTOLIC BLOOD PRESSURE: 102 MMHG | HEIGHT: 67 IN

## 2022-09-14 DIAGNOSIS — Z87.42 HISTORY OF OVARIAN CYST: ICD-10-CM

## 2022-09-14 DIAGNOSIS — Z76.89 ENCOUNTER TO ESTABLISH CARE WITH NEW DOCTOR: Primary | ICD-10-CM

## 2022-09-14 PROCEDURE — 3008F PR BODY MASS INDEX (BMI) DOCUMENTED: ICD-10-PCS | Mod: CPTII,S$GLB,, | Performed by: STUDENT IN AN ORGANIZED HEALTH CARE EDUCATION/TRAINING PROGRAM

## 2022-09-14 PROCEDURE — 3008F BODY MASS INDEX DOCD: CPT | Mod: CPTII,S$GLB,, | Performed by: STUDENT IN AN ORGANIZED HEALTH CARE EDUCATION/TRAINING PROGRAM

## 2022-09-14 PROCEDURE — 3078F PR MOST RECENT DIASTOLIC BLOOD PRESSURE < 80 MM HG: ICD-10-PCS | Mod: CPTII,S$GLB,, | Performed by: STUDENT IN AN ORGANIZED HEALTH CARE EDUCATION/TRAINING PROGRAM

## 2022-09-14 PROCEDURE — 1159F MED LIST DOCD IN RCRD: CPT | Mod: CPTII,S$GLB,, | Performed by: STUDENT IN AN ORGANIZED HEALTH CARE EDUCATION/TRAINING PROGRAM

## 2022-09-14 PROCEDURE — 99999 PR PBB SHADOW E&M-EST. PATIENT-LVL III: ICD-10-PCS | Mod: PBBFAC,,, | Performed by: STUDENT IN AN ORGANIZED HEALTH CARE EDUCATION/TRAINING PROGRAM

## 2022-09-14 PROCEDURE — 1159F PR MEDICATION LIST DOCUMENTED IN MEDICAL RECORD: ICD-10-PCS | Mod: CPTII,S$GLB,, | Performed by: STUDENT IN AN ORGANIZED HEALTH CARE EDUCATION/TRAINING PROGRAM

## 2022-09-14 PROCEDURE — 3074F SYST BP LT 130 MM HG: CPT | Mod: CPTII,S$GLB,, | Performed by: STUDENT IN AN ORGANIZED HEALTH CARE EDUCATION/TRAINING PROGRAM

## 2022-09-14 PROCEDURE — 99385 PR PREVENTIVE VISIT,NEW,18-39: ICD-10-PCS | Mod: S$GLB,,, | Performed by: STUDENT IN AN ORGANIZED HEALTH CARE EDUCATION/TRAINING PROGRAM

## 2022-09-14 PROCEDURE — 99385 PREV VISIT NEW AGE 18-39: CPT | Mod: S$GLB,,, | Performed by: STUDENT IN AN ORGANIZED HEALTH CARE EDUCATION/TRAINING PROGRAM

## 2022-09-14 PROCEDURE — 99999 PR PBB SHADOW E&M-EST. PATIENT-LVL III: CPT | Mod: PBBFAC,,, | Performed by: STUDENT IN AN ORGANIZED HEALTH CARE EDUCATION/TRAINING PROGRAM

## 2022-09-14 PROCEDURE — 3078F DIAST BP <80 MM HG: CPT | Mod: CPTII,S$GLB,, | Performed by: STUDENT IN AN ORGANIZED HEALTH CARE EDUCATION/TRAINING PROGRAM

## 2022-09-14 PROCEDURE — 3074F PR MOST RECENT SYSTOLIC BLOOD PRESSURE < 130 MM HG: ICD-10-PCS | Mod: CPTII,S$GLB,, | Performed by: STUDENT IN AN ORGANIZED HEALTH CARE EDUCATION/TRAINING PROGRAM

## 2022-09-14 RX ORDER — FLUOXETINE HYDROCHLORIDE 20 MG/1
20 CAPSULE ORAL DAILY
COMMUNITY

## 2022-09-14 NOTE — PROGRESS NOTES
History & Physical  Gynecology      SUBJECTIVE:     Chief Complaint: Well Woman       History of Present Illness:  Eliseo Ybarra is a 32 y.o.  who presents to establish care. She recently moved back to the [place] area from [place] and wanted to establish GYN care. [facility] recently had an annual, does not need pap or breast exam today.  [facility] recently had STI testing, declines today. No complaints.    Does have history of ovarian cyst. Per records 2.7 cm hemorrhagic cyst. Managed conservatively. No current pelvic pain or concerns.   Has paragard IUD in place since May 2022. States periods are a little heavier but are regular. Likes the paragard more than the OCPs she had been on before.  Does state interested in discussing fertility. Not trying or planning to conceive at this time. No history of STIs. Does have autoimmune hepatitis for which she is taking entecort and imuran. History of prolonged steroid use earlier in life.         Review of patient's allergies indicates:  No Known Allergies    Past Medical History:   Diagnosis Date    Anxiety     Auto immune neutropenia     Autoimmune hepatitis     Depression      History reviewed. No pertinent surgical history.  OB History          0    Para   0    Term   0       0    AB   0    Living   0         SAB   0    IAB   0    Ectopic   0    Multiple   0    Live Births   0               Family History   Problem Relation Age of Onset    Cancer Paternal Grandfather     Depression Paternal Grandfather     Depression Paternal Grandmother     Depression Maternal Grandfather     ALS Maternal Grandfather     Vitiligo Maternal Grandfather     Alcohol abuse Father     Cancer Father     Depression Father     Depression Mother     Hyperlipidemia Mother     Depression Brother     Depression Brother     Depression Sister     Cancer Maternal Aunt     Breast cancer Neg Hx     Colon cancer Neg Hx     Ovarian cancer Neg Hx      Social History     Tobacco  Use    Smoking status: Former     Types: Cigarettes   Substance Use Topics    Alcohol use: Not Currently     Comment: 3 sober    Drug use: Not Currently     Types: Marijuana       Current Outpatient Medications   Medication Sig    azaTHIOprine (IMURAN) 50 mg Tab Take 2 tablets (100 mg total) by mouth once daily.    budesonide (ENTOCORT EC) 3 mg capsule Take 3 mg by mouth once daily.    buPROPion (WELLBUTRIN XL) 300 MG 24 hr tablet Take 300 mg by mouth once daily.    FLUoxetine 20 MG capsule Take 20 mg by mouth once daily. Currently taking 10 mg for 1 week the 20 mg daily    triamcinolone acetonide 0.1% (KENALOG) 0.1 % cream SMARTSI Application Topical 2-3 Times Daily    EScitalopram oxalate (LEXAPRO) 10 MG tablet Take 10 mg by mouth once daily.     No current facility-administered medications for this visit.         Review of Systems:  Review of Systems   Constitutional:  Negative for chills and fever.   Gastrointestinal:  Negative for abdominal pain.   Genitourinary:  Negative for menstrual problem and pelvic pain.      OBJECTIVE:     Physical Exam:  Physical Exam  Vitals reviewed.   Constitutional:       General: She is not in acute distress.     Appearance: She is well-developed. She is not ill-appearing, toxic-appearing or diaphoretic.   HENT:      Head: Normocephalic and atraumatic.   Eyes:      Conjunctiva/sclera: Conjunctivae normal.   Cardiovascular:      Rate and Rhythm: Normal rate.   Pulmonary:      Effort: Pulmonary effort is normal. No respiratory distress.   Abdominal:      Palpations: Abdomen is soft.      Tenderness: There is no abdominal tenderness.   Musculoskeletal:         General: No tenderness. Normal range of motion.      Cervical back: Normal range of motion.   Skin:     General: Skin is warm and dry.   Neurological:      Mental Status: She is alert and oriented to person, place, and time.   Psychiatric:         Behavior: Behavior normal.         Thought Content: Thought content normal.          Judgment: Judgment normal.         ASSESSMENT:       ICD-10-CM ICD-9-CM    1. Encounter to establish care with new doctor  Z76.89 V65.8       2. History of ovarian cyst  Z87.42 V13.29              Plan:      -- Discussed ovarian cysts and commonality. Not having any symptoms currently. Briefly discussed OCPs as means of prevention. She states she is not concerned at this time, will continue with paragard for now.  -- In terms of fertility, workup not indicated at this time. Discussed ovulatory and structural pathologies that may cause problems with fertility. Talked about utility of AMH as marker of ovarian reserve. Will hold off on testing.   -- Recommend starting PNV when ready to start trying to conceive.   -- RTC for annual or PRN.      Lia Huynh MD

## 2022-09-22 ENCOUNTER — OFFICE VISIT (OUTPATIENT)
Dept: HEPATOLOGY | Facility: CLINIC | Age: 32
End: 2022-09-22
Payer: COMMERCIAL

## 2022-09-22 VITALS
TEMPERATURE: 99 F | BODY MASS INDEX: 19.93 KG/M2 | HEIGHT: 67 IN | HEART RATE: 81 BPM | WEIGHT: 127 LBS | OXYGEN SATURATION: 96 % | SYSTOLIC BLOOD PRESSURE: 119 MMHG | DIASTOLIC BLOOD PRESSURE: 78 MMHG | RESPIRATION RATE: 18 BRPM

## 2022-09-22 DIAGNOSIS — Z79.60 LONG-TERM USE OF IMMUNOSUPPRESSANT MEDICATION: ICD-10-CM

## 2022-09-22 DIAGNOSIS — K75.4 AUTOIMMUNE HEPATITIS: Primary | ICD-10-CM

## 2022-09-22 PROCEDURE — 3008F BODY MASS INDEX DOCD: CPT | Mod: CPTII,S$GLB,, | Performed by: NURSE PRACTITIONER

## 2022-09-22 PROCEDURE — 3078F PR MOST RECENT DIASTOLIC BLOOD PRESSURE < 80 MM HG: ICD-10-PCS | Mod: CPTII,S$GLB,, | Performed by: NURSE PRACTITIONER

## 2022-09-22 PROCEDURE — 99214 OFFICE O/P EST MOD 30 MIN: CPT | Mod: S$GLB,,, | Performed by: NURSE PRACTITIONER

## 2022-09-22 PROCEDURE — 99214 PR OFFICE/OUTPT VISIT, EST, LEVL IV, 30-39 MIN: ICD-10-PCS | Mod: S$GLB,,, | Performed by: NURSE PRACTITIONER

## 2022-09-22 PROCEDURE — 3078F DIAST BP <80 MM HG: CPT | Mod: CPTII,S$GLB,, | Performed by: NURSE PRACTITIONER

## 2022-09-22 PROCEDURE — 99999 PR PBB SHADOW E&M-EST. PATIENT-LVL IV: CPT | Mod: PBBFAC,,, | Performed by: NURSE PRACTITIONER

## 2022-09-22 PROCEDURE — 1159F PR MEDICATION LIST DOCUMENTED IN MEDICAL RECORD: ICD-10-PCS | Mod: CPTII,S$GLB,, | Performed by: NURSE PRACTITIONER

## 2022-09-22 PROCEDURE — 1159F MED LIST DOCD IN RCRD: CPT | Mod: CPTII,S$GLB,, | Performed by: NURSE PRACTITIONER

## 2022-09-22 PROCEDURE — 1160F RVW MEDS BY RX/DR IN RCRD: CPT | Mod: CPTII,S$GLB,, | Performed by: NURSE PRACTITIONER

## 2022-09-22 PROCEDURE — 3074F SYST BP LT 130 MM HG: CPT | Mod: CPTII,S$GLB,, | Performed by: NURSE PRACTITIONER

## 2022-09-22 PROCEDURE — 3008F PR BODY MASS INDEX (BMI) DOCUMENTED: ICD-10-PCS | Mod: CPTII,S$GLB,, | Performed by: NURSE PRACTITIONER

## 2022-09-22 PROCEDURE — 1160F PR REVIEW ALL MEDS BY PRESCRIBER/CLIN PHARMACIST DOCUMENTED: ICD-10-PCS | Mod: CPTII,S$GLB,, | Performed by: NURSE PRACTITIONER

## 2022-09-22 PROCEDURE — 3074F PR MOST RECENT SYSTOLIC BLOOD PRESSURE < 130 MM HG: ICD-10-PCS | Mod: CPTII,S$GLB,, | Performed by: NURSE PRACTITIONER

## 2022-09-22 PROCEDURE — 99999 PR PBB SHADOW E&M-EST. PATIENT-LVL IV: ICD-10-PCS | Mod: PBBFAC,,, | Performed by: NURSE PRACTITIONER

## 2022-09-22 NOTE — PATIENT INSTRUCTIONS
Increase Azathioprine to 100 mg by mouth daily.  Continue Budesonide 3 mg daily.  Repeat labs in 1 month to assess response.  Return to clinic to be determined by lab results.

## 2022-09-22 NOTE — PROGRESS NOTES
[facility] PT NOTE    REFERRING PROVIDER:  No ref. provider found    CHIEF COMPLAINT: Autoimmune Hepatitis    HPI: This is a 32 y.o. White female with PMH noted below, presenting for follow up for autoimmune hepatitis. She was previously followed by a Hepatologist in [place], and was last seen in clinic by myself in 8/2022 for an initial visit. She was diagnosed with autoimmune hepatitis at age 7, previously treated with Azathioprine and steroids. She is now on low dose Budesonide, in addition to Azathioprine (50 mg daily). Last liver biopsy was performed in 2006 (no evidence of significant fibrosis, per patient report).   Family history is significant for Father with a history of alcohol abuse. She also has a history of alcohol abuse, and illicit drug use; sober since 2018. She denies current illicit drug use. HCV negative on prior labs in 2015. She is immune to Hepatitis A and B, through prior vaccination. Fibroscan to restage her liver disease earlier this month was suggestive of no significant hepatic steatosis with F0-F1 fibrosis and a low likelihood of cirrhosis. US showed no focal liver lesions, with mild splenomegaly (13 cm). US also showed small gallbladder cholesterol crystals, & borderline dilatation of the CBD (7 mm). Blood counts and renal function is normal. LFT's show elevated liver enzymes (, , AST 99); synthetic function remains intact.   She is well appearing, and has no signs or symptoms of hepatic decompensation including jaundice, dark urine, abdominal distention, lower extremity, hematemesis, melena, or periods of confusion suggestive of hepatic encephalopathy. No abnormal skin rashes, or generalized joint pain.     Review of patient's allergies indicates:  No Known Allergies    Current Outpatient Medications on File Prior to Visit   Medication Sig Dispense Refill    azaTHIOprine (IMURAN) 50 mg Tab Take 2 tablets (100 mg total) by mouth once daily. 60  tablet 11    budesonide (ENTOCORT EC) 3 mg capsule Take 3 mg by mouth once daily.      buPROPion (WELLBUTRIN XL) 300 MG 24 hr tablet Take 300 mg by mouth once daily.      copper intrauterine device (PARAGARD) 380 square mm IUD 1 Intra Uterine Device by Intrauterine route once.      FLUoxetine 20 MG capsule Take 20 mg by mouth once daily. Currently taking 10 mg for 1 week the 20 mg daily      triamcinolone acetonide 0.1% (KENALOG) 0.1 % cream SMARTSI Application Topical 2-3 Times Daily      [DISCONTINUED] EScitalopram oxalate (LEXAPRO) 10 MG tablet Take 10 mg by mouth once daily.       No current facility-administered medications on file prior to visit.     PMHX:  has a past medical history of Anxiety, Auto immune neutropenia, Autoimmune hepatitis, and Depression.    PSHX:  has no past surgical history on file.    FAMILY HISTORY: Negative for liver disease, reviewed in [facility]    SOCIAL HISTORY:   Social History     Tobacco Use   Smoking Status Former    Types: Cigarettes   Smokeless Tobacco Former     Social History     Substance and Sexual Activity   Alcohol Use Not Currently    Comment: History of alcohol abuse, sober since 2018.     Social History     Substance and Sexual Activity   Drug Use Not Currently    Types: Marijuana     ROS:   GENERAL: Denies fever, chills, weight loss/gain, fatigue  HEENT: Denies headaches, dizziness, vision/hearing changes  CARDIOVASCULAR: Denies chest pain, palpitations, or edema  RESPIRATORY: Denies dyspnea, cough  GI: Denies abdominal pain, rectal bleeding, nausea, vomiting. No change in bowel pattern or color  : Denies dysuria, hematuria   SKIN: Denies rash, itching   NEURO: Denies confusion, memory loss, or mood changes  PSYCH: Denies depression or anxiety  HEME/LYMPH: Denies easy bruising or bleeding    PHYSICAL EXAM:   Friendly White female, in no acute distress; alert and oriented to person, place and time.  VITALS: /78 (BP Location: Right arm, Patient Position:  "Sitting, BP Method: Medium (Automatic))   Pulse 81   Temp 98.8 °F (37.1 °C) (Oral)   Resp 18   Ht 5' 7" (1.702 m)   Wt 57.6 kg (127 lb)   LMP 09/05/2022 Comment: Paragard placed may 2022  SpO2 96%   BMI 19.89 kg/m²   HENT: Normocephalic, without obvious abnormality.   EYES: Sclerae anicteric.   NECK: No obvious masses.  CARDIOVASCULAR: Regular rate. No peripheral edema.  RESPIRATORY: Normal respiratory effort.  GI: Soft, non-tender, non-distended. No hepatosplenomegaly. No masses palpable. No obvious ascites.  EXTREMITIES:  No clubbing, cyanosis or edema.  SKIN: Warm and dry. No jaundice.   NEURO:  Normal gait. No asterixis.  PSYCH:  Memory intact. Thought and speech pattern appropriate. Behavior normal. No depression or anxiety noted.    DIAGNOSTIC STUDIES:    US Abdomen Complete  Narrative: EXAMINATION:  US ABDOMEN COMPLETE    CLINICAL HISTORY:  Autoimmune hepatitis    TECHNIQUE:  Complete abdominal ultrasound (including pancreas, aorta, liver, gallbladder, common bile duct, IVC, kidneys, and spleen) was performed.    COMPARISON:  Abdominal ultrasound 05/27/2015.    FINDINGS:  Pancreas: The visualized portions of pancreas appear normal.    Aorta: No aneurysm.    Liver:  Normal in size, measuring 14.9 cm.  Heterogenous/coarse parenchymal echotexture. No focal lesions.  HRI measures 1.4.    Gallbladder: Small echogenic foci, likely cholesterol crystals.  No shadowing stones.  No wall thickening or pericholecystic fluid.  Negative sonographic Gray's sign.    Biliary system: Borderline dilated measuring 7 mm at maximum diameter.  No intrahepatic ductal dilatation.    Inferior vena cava: Normal in appearance.    Right kidney: 10.7 cm. No stones, solid mass, or hydronephrosis.    Left kidney: 11.3 cm.  No stones, solid mass, or hydronephrosis.    Spleen:  Enlarged measuring 13 x 5 cm.  Homogeneous architecture.    Miscellaneous: No ascites.  Impression: Heterogenous hepatic echotexture in patient with history " of autoimmune hepatitis.    Small gallbladder cholesterol crystals.  No shadowing stones.    Borderline dilatation of the common bile duct.    Splenomegaly.    Electronically signed by resident: Darlene Mathew  Date:    09/06/2022  Time:    13:01    Electronically signed by: Gene Hodge MD  Date:    09/06/2022  Time:    13:23    FIBROSCAN 9/6/2022:    Findings  Median liver stiffness score:  5.3  CAP Reading: dB/m:  234     IQR/med %:  8  Interpretation  Fibrosis interpretation is based on medial liver stiffness - Kilopascal (kPa).     Fibrosis Stage:  F 0-1  Steatosis interpretation is based on controlled attenuation parameter - (dB/m).     Steatosis Grade:  S1    ASSESSMENT & PLAN:  32 y.o. White female with:      1. Autoimmune hepatitis  Comprehensive Metabolic Panel    CBC Auto Differential      2. Long-term use of immunosuppressant medication  Comprehensive Metabolic Panel    CBC Auto Differential        - Recommend Fibroscan to re-stage liver disease every 2 years, next due 9/2024.  - Recommend ultrasound of the liver every 2 years, next due 9/2024.  - Increase Azathioprine to 100 mg PO daily.  - Continue Budesonide 3 mg daily for now.   - Repeat labs in 1 month to assess response to increased dose of Azathioprine.  - Continue to remain abstinent from alcohol and illicit drugs.   - Return to clinic to be determined by lab results.    Thank you for allowing me to participate in the care of Eliseo Ybarra       Hepatology Nurse Practitioner  [facility]  9/22/2022 @ 1502    CC'ed note to:   No ref. provider found  Stevie Seaman MD

## 2022-10-20 ENCOUNTER — TELEPHONE (OUTPATIENT)
Dept: HEPATOLOGY | Facility: CLINIC | Age: 32
End: 2022-10-20
Payer: COMMERCIAL

## 2022-10-20 ENCOUNTER — LAB VISIT (OUTPATIENT)
Dept: LAB | Facility: HOSPITAL | Age: 32
End: 2022-10-20
Payer: COMMERCIAL

## 2022-10-20 DIAGNOSIS — K75.4 AUTOIMMUNE HEPATITIS: Primary | ICD-10-CM

## 2022-10-20 DIAGNOSIS — K75.4 AUTOIMMUNE HEPATITIS: ICD-10-CM

## 2022-10-20 DIAGNOSIS — Z79.60 LONG-TERM USE OF IMMUNOSUPPRESSANT MEDICATION: ICD-10-CM

## 2022-10-20 LAB
ALBUMIN SERPL BCP-MCNC: 4.2 G/DL (ref 3.5–5.2)
ALP SERPL-CCNC: 273 U/L (ref 55–135)
ALT SERPL W/O P-5'-P-CCNC: 65 U/L (ref 10–44)
ANION GAP SERPL CALC-SCNC: 5 MMOL/L (ref 8–16)
AST SERPL-CCNC: 81 U/L (ref 10–40)
BASOPHILS # BLD AUTO: 0.05 K/UL (ref 0–0.2)
BASOPHILS NFR BLD: 0.9 % (ref 0–1.9)
BILIRUB SERPL-MCNC: 0.5 MG/DL (ref 0.1–1)
BUN SERPL-MCNC: 12 MG/DL (ref 6–20)
CALCIUM SERPL-MCNC: 10.1 MG/DL (ref 8.7–10.5)
CHLORIDE SERPL-SCNC: 103 MMOL/L (ref 95–110)
CO2 SERPL-SCNC: 27 MMOL/L (ref 23–29)
CREAT SERPL-MCNC: 0.8 MG/DL (ref 0.5–1.4)
DIFFERENTIAL METHOD: ABNORMAL
EOSINOPHIL # BLD AUTO: 0.6 K/UL (ref 0–0.5)
EOSINOPHIL NFR BLD: 11.3 % (ref 0–8)
ERYTHROCYTE [DISTWIDTH] IN BLOOD BY AUTOMATED COUNT: 13.7 % (ref 11.5–14.5)
EST. GFR  (NO RACE VARIABLE): >60 ML/MIN/1.73 M^2
GLUCOSE SERPL-MCNC: 86 MG/DL (ref 70–110)
HCT VFR BLD AUTO: 36.5 % (ref 37–48.5)
HGB BLD-MCNC: 11.8 G/DL (ref 12–16)
IMM GRANULOCYTES # BLD AUTO: 0.02 K/UL (ref 0–0.04)
IMM GRANULOCYTES NFR BLD AUTO: 0.4 % (ref 0–0.5)
LYMPHOCYTES # BLD AUTO: 1.1 K/UL (ref 1–4.8)
LYMPHOCYTES NFR BLD: 19.1 % (ref 18–48)
MCH RBC QN AUTO: 29.9 PG (ref 27–31)
MCHC RBC AUTO-ENTMCNC: 32.3 G/DL (ref 32–36)
MCV RBC AUTO: 92 FL (ref 82–98)
MONOCYTES # BLD AUTO: 0.3 K/UL (ref 0.3–1)
MONOCYTES NFR BLD: 6.2 % (ref 4–15)
NEUTROPHILS # BLD AUTO: 3.4 K/UL (ref 1.8–7.7)
NEUTROPHILS NFR BLD: 62.1 % (ref 38–73)
NRBC BLD-RTO: 0 /100 WBC
PLATELET # BLD AUTO: 153 K/UL (ref 150–450)
PMV BLD AUTO: 9.8 FL (ref 9.2–12.9)
POTASSIUM SERPL-SCNC: 4.3 MMOL/L (ref 3.5–5.1)
PROT SERPL-MCNC: 7.7 G/DL (ref 6–8.4)
RBC # BLD AUTO: 3.95 M/UL (ref 4–5.4)
SODIUM SERPL-SCNC: 135 MMOL/L (ref 136–145)
WBC # BLD AUTO: 5.5 K/UL (ref 3.9–12.7)

## 2022-10-20 PROCEDURE — 80053 COMPREHEN METABOLIC PANEL: CPT | Performed by: NURSE PRACTITIONER

## 2022-10-20 PROCEDURE — 36415 COLL VENOUS BLD VENIPUNCTURE: CPT | Performed by: NURSE PRACTITIONER

## 2022-10-20 PROCEDURE — 85025 COMPLETE CBC W/AUTO DIFF WBC: CPT | Performed by: NURSE PRACTITIONER

## 2022-10-20 NOTE — TELEPHONE ENCOUNTER
----- Message from Kelly Claire NP sent at 10/20/2022 10:32 AM CDT -----  Please contact patient, and arrange repeat labs in 1 month. Orders in Epic. Thanks!

## 2022-10-28 ENCOUNTER — PATIENT MESSAGE (OUTPATIENT)
Dept: HEPATOLOGY | Facility: CLINIC | Age: 32
End: 2022-10-28
Payer: COMMERCIAL

## 2022-11-18 ENCOUNTER — LAB VISIT (OUTPATIENT)
Dept: LAB | Facility: HOSPITAL | Age: 32
End: 2022-11-18
Payer: COMMERCIAL

## 2022-11-18 DIAGNOSIS — K75.4 AUTOIMMUNE HEPATITIS: ICD-10-CM

## 2022-11-18 DIAGNOSIS — Z79.60 LONG-TERM USE OF IMMUNOSUPPRESSANT MEDICATION: ICD-10-CM

## 2022-11-18 LAB
ALBUMIN SERPL BCP-MCNC: 4 G/DL (ref 3.5–5.2)
ALP SERPL-CCNC: 200 U/L (ref 55–135)
ALT SERPL W/O P-5'-P-CCNC: 37 U/L (ref 10–44)
ANION GAP SERPL CALC-SCNC: 7 MMOL/L (ref 8–16)
AST SERPL-CCNC: 43 U/L (ref 10–40)
BASOPHILS # BLD AUTO: 0.04 K/UL (ref 0–0.2)
BASOPHILS NFR BLD: 0.9 % (ref 0–1.9)
BILIRUB SERPL-MCNC: 0.4 MG/DL (ref 0.1–1)
BUN SERPL-MCNC: 11 MG/DL (ref 6–20)
CALCIUM SERPL-MCNC: 9.1 MG/DL (ref 8.7–10.5)
CHLORIDE SERPL-SCNC: 110 MMOL/L (ref 95–110)
CO2 SERPL-SCNC: 23 MMOL/L (ref 23–29)
CREAT SERPL-MCNC: 0.8 MG/DL (ref 0.5–1.4)
DIFFERENTIAL METHOD: ABNORMAL
EOSINOPHIL # BLD AUTO: 0.4 K/UL (ref 0–0.5)
EOSINOPHIL NFR BLD: 9.5 % (ref 0–8)
ERYTHROCYTE [DISTWIDTH] IN BLOOD BY AUTOMATED COUNT: 14.9 % (ref 11.5–14.5)
EST. GFR  (NO RACE VARIABLE): >60 ML/MIN/1.73 M^2
GLUCOSE SERPL-MCNC: 84 MG/DL (ref 70–110)
HCT VFR BLD AUTO: 33.7 % (ref 37–48.5)
HGB BLD-MCNC: 11.1 G/DL (ref 12–16)
IMM GRANULOCYTES # BLD AUTO: 0.01 K/UL (ref 0–0.04)
IMM GRANULOCYTES NFR BLD AUTO: 0.2 % (ref 0–0.5)
INR PPP: 1 (ref 0.8–1.2)
LYMPHOCYTES # BLD AUTO: 0.9 K/UL (ref 1–4.8)
LYMPHOCYTES NFR BLD: 21.3 % (ref 18–48)
MCH RBC QN AUTO: 30.5 PG (ref 27–31)
MCHC RBC AUTO-ENTMCNC: 32.9 G/DL (ref 32–36)
MCV RBC AUTO: 93 FL (ref 82–98)
MONOCYTES # BLD AUTO: 0.3 K/UL (ref 0.3–1)
MONOCYTES NFR BLD: 7.5 % (ref 4–15)
NEUTROPHILS # BLD AUTO: 2.7 K/UL (ref 1.8–7.7)
NEUTROPHILS NFR BLD: 60.6 % (ref 38–73)
NRBC BLD-RTO: 0 /100 WBC
PLATELET # BLD AUTO: 111 K/UL (ref 150–450)
PMV BLD AUTO: 9.9 FL (ref 9.2–12.9)
POTASSIUM SERPL-SCNC: 4.3 MMOL/L (ref 3.5–5.1)
PROT SERPL-MCNC: 7.1 G/DL (ref 6–8.4)
PROTHROMBIN TIME: 10.9 SEC (ref 9–12.5)
RBC # BLD AUTO: 3.64 M/UL (ref 4–5.4)
SODIUM SERPL-SCNC: 140 MMOL/L (ref 136–145)
WBC # BLD AUTO: 4.41 K/UL (ref 3.9–12.7)

## 2022-11-18 PROCEDURE — 36415 COLL VENOUS BLD VENIPUNCTURE: CPT | Performed by: NURSE PRACTITIONER

## 2022-11-18 PROCEDURE — 80053 COMPREHEN METABOLIC PANEL: CPT | Performed by: NURSE PRACTITIONER

## 2022-11-18 PROCEDURE — 80299 QUANTITATIVE ASSAY DRUG: CPT | Performed by: NURSE PRACTITIONER

## 2022-11-18 PROCEDURE — 85025 COMPLETE CBC W/AUTO DIFF WBC: CPT | Performed by: NURSE PRACTITIONER

## 2022-11-18 PROCEDURE — 85610 PROTHROMBIN TIME: CPT | Performed by: NURSE PRACTITIONER

## 2022-11-22 LAB
6-TGN ENTSUB RBC: 29 PMOL/8X10(8)RBC (ref 235–450)
6MMP ENTSUB RBC: <367 PMOL/8X10(8)RBC

## 2022-11-25 ENCOUNTER — TELEPHONE (OUTPATIENT)
Dept: HEPATOLOGY | Facility: CLINIC | Age: 32
End: 2022-11-25
Payer: COMMERCIAL

## 2022-11-25 NOTE — TELEPHONE ENCOUNTER
----- Message from Kelly Claire NP sent at 11/22/2022  1:07 PM CST -----  Please contact patient to arrange a f/u visit with me to review lab results. Thanks!

## 2022-12-06 ENCOUNTER — OFFICE VISIT (OUTPATIENT)
Dept: DERMATOLOGY | Facility: CLINIC | Age: 32
End: 2022-12-06
Payer: COMMERCIAL

## 2022-12-06 DIAGNOSIS — D22.9 MULTIPLE BENIGN NEVI: Primary | ICD-10-CM

## 2022-12-06 DIAGNOSIS — Z12.83 SCREENING EXAM FOR SKIN CANCER: ICD-10-CM

## 2022-12-06 PROCEDURE — 99999 PR PBB SHADOW E&M-EST. PATIENT-LVL II: CPT | Mod: PBBFAC,,, | Performed by: DERMATOLOGY

## 2022-12-06 PROCEDURE — 1160F PR REVIEW ALL MEDS BY PRESCRIBER/CLIN PHARMACIST DOCUMENTED: ICD-10-PCS | Mod: CPTII,S$GLB,, | Performed by: DERMATOLOGY

## 2022-12-06 PROCEDURE — 99999 PR PBB SHADOW E&M-EST. PATIENT-LVL II: ICD-10-PCS | Mod: PBBFAC,,, | Performed by: DERMATOLOGY

## 2022-12-06 PROCEDURE — 1159F MED LIST DOCD IN RCRD: CPT | Mod: CPTII,S$GLB,, | Performed by: DERMATOLOGY

## 2022-12-06 PROCEDURE — 1160F RVW MEDS BY RX/DR IN RCRD: CPT | Mod: CPTII,S$GLB,, | Performed by: DERMATOLOGY

## 2022-12-06 PROCEDURE — 99203 OFFICE O/P NEW LOW 30 MIN: CPT | Mod: S$GLB,,, | Performed by: DERMATOLOGY

## 2022-12-06 PROCEDURE — 99203 PR OFFICE/OUTPT VISIT, NEW, LEVL III, 30-44 MIN: ICD-10-PCS | Mod: S$GLB,,, | Performed by: DERMATOLOGY

## 2022-12-06 PROCEDURE — 1159F PR MEDICATION LIST DOCUMENTED IN MEDICAL RECORD: ICD-10-PCS | Mod: CPTII,S$GLB,, | Performed by: DERMATOLOGY

## 2022-12-06 NOTE — PROGRESS NOTES
Subjective:       Patient ID:  Eliseo Ybarra is a 32 y.o. female who presents for   Chief Complaint   Patient presents with    Skin Check     TBSE     Patient here for Total Body Skin Exam    Last seen by dermatologist: 2021    none - personal history of atypical moles removed  none - personal history of MM   Yes, father - family history of MM  none - childhood blistering sunburns  none - tanning bed use  none - personal history of NMSC    Patient with specific complaint of lesion(s)  Location: Forehead  Duration: 4-6 months  Symptoms: none  Relieving factors/Previous treatments: none    H/o autoimmune hepatitis - on imuran x many years; was on pred x 20 years until 2019    Review of Systems   Skin:  Positive for daily sunscreen use and activity-related sunscreen use. Negative for recent sunburn and wears hat.   Hematologic/Lymphatic: Does not bruise/bleed easily.      Objective:    Physical Exam   Constitutional: She appears well-developed and well-nourished. No distress.   Neurological: She is alert and oriented to person, place, and time. She is not disoriented.   Psychiatric: She has a normal mood and affect.   Skin:   Areas Examined (abnormalities noted in diagram):   Scalp / Hair Palpated and Inspected  Head / Face Inspection Performed  Neck Inspection Performed  Chest / Axilla Inspection Performed  Abdomen Inspection Performed  Genitals / Buttocks / Groin Inspection Performed  Back Inspection Performed  RUE Inspected  LUE Inspection Performed  RLE Inspected  LLE Inspection Performed  Nails and Digits Inspection Performed                     Diagram Legend     Erythematous scaling macule/papule c/w actinic keratosis       Vascular papule c/w angioma      Pigmented verrucoid papule/plaque c/w seborrheic keratosis      Yellow umbilicated papule c/w sebaceous hyperplasia      Irregularly shaped tan macule c/w lentigo     1-2 mm smooth white papules consistent with Milia      Movable subcutaneous cyst with  punctum c/w epidermal inclusion cyst      Subcutaneous movable cyst c/w pilar cyst      Firm pink to brown papule c/w dermatofibroma      Pedunculated fleshy papule(s) c/w skin tag(s)      Evenly pigmented macule c/w junctional nevus     Mildly variegated pigmented, slightly irregular-bordered macule c/w mildly atypical nevus      Flesh colored to evenly pigmented papule c/w intradermal nevus       Pink pearly papule/plaque c/w basal cell carcinoma      Erythematous hyperkeratotic cursted plaque c/w SCC      Surgical scar with no sign of skin cancer recurrence      Open and closed comedones      Inflammatory papules and pustules      Verrucoid papule consistent consistent with wart     Erythematous eczematous patches and plaques     Dystrophic onycholytic nail with subungual debris c/w onychomycosis     Umbilicated papule    Erythematous-base heme-crusted tan verrucoid plaque consistent with inflamed seborrheic keratosis     Erythematous Silvery Scaling Plaque c/w Psoriasis     See annotation      Assessment / Plan:        Multiple benign nevi  total body skin examination performed today including at least 12 points as noted in physical examination. No lesions suspicious for malignancy noted.  Reassurance provided.    Instructed patient to observe lesion(s) for changes and follow up in clinic if changes are noted. Patient to monitor skin at home for new or changing lesions and follow up in clinic if noted.    Discussed ABCDE's of moles and brochure provided.      Screening exam for skin cancer  Total body skin examination performed today including at least 12 points as noted in physical examination. No lesions suspicious for malignancy noted.    Recommend daily sun protection/avoidance, use of at least SPF 30, broad spectrum sunscreen (OTC drug), skin self examinations, and routine physician surveillance to optimize early detection             No follow-ups on file.

## 2022-12-14 ENCOUNTER — TELEPHONE (OUTPATIENT)
Dept: HEPATOLOGY | Facility: CLINIC | Age: 32
End: 2022-12-14

## 2022-12-14 ENCOUNTER — OFFICE VISIT (OUTPATIENT)
Dept: HEPATOLOGY | Facility: CLINIC | Age: 32
End: 2022-12-14
Payer: COMMERCIAL

## 2022-12-14 DIAGNOSIS — K75.4 AUTOIMMUNE HEPATITIS: Primary | ICD-10-CM

## 2022-12-14 DIAGNOSIS — Z79.60 LONG-TERM USE OF IMMUNOSUPPRESSANT MEDICATION: ICD-10-CM

## 2022-12-14 PROCEDURE — 1160F PR REVIEW ALL MEDS BY PRESCRIBER/CLIN PHARMACIST DOCUMENTED: ICD-10-PCS | Mod: CPTII,95,, | Performed by: NURSE PRACTITIONER

## 2022-12-14 PROCEDURE — 1159F PR MEDICATION LIST DOCUMENTED IN MEDICAL RECORD: ICD-10-PCS | Mod: CPTII,95,, | Performed by: NURSE PRACTITIONER

## 2022-12-14 PROCEDURE — 1159F MED LIST DOCD IN RCRD: CPT | Mod: CPTII,95,, | Performed by: NURSE PRACTITIONER

## 2022-12-14 PROCEDURE — 99213 OFFICE O/P EST LOW 20 MIN: CPT | Mod: 95,,, | Performed by: NURSE PRACTITIONER

## 2022-12-14 PROCEDURE — 1160F RVW MEDS BY RX/DR IN RCRD: CPT | Mod: CPTII,95,, | Performed by: NURSE PRACTITIONER

## 2022-12-14 PROCEDURE — 99213 PR OFFICE/OUTPT VISIT, EST, LEVL III, 20-29 MIN: ICD-10-PCS | Mod: 95,,, | Performed by: NURSE PRACTITIONER

## 2022-12-14 NOTE — TELEPHONE ENCOUNTER
----- Message from Kelly Claire NP sent at 12/14/2022  3:11 PM CST -----  Please arrange repeat labs in January. Orders in Epic. RTC TBD. Thank You!

## 2022-12-14 NOTE — Clinical Note
Please arrange repeat labs in January. Orders in [facility]. [facility] TBD. Thank You!

## 2022-12-14 NOTE — PROGRESS NOTES
The patient location is: [place], [place].  The chief complaint leading to consultation is: Autoimmune Hepatitis     Visit type: audiovisual    Face to Face time with patient: 10  20 minutes of total time spent on the encounter, which includes face to face time and non-face to face time preparing to see the patient (eg, review of tests), Obtaining and/or reviewing separately obtained history, Documenting clinical information in the electronic or other health record, Independently interpreting results (not separately reported) and communicating results to the patient/family/caregiver, or Care coordination (not separately reported).     Each patient to whom he or she provides medical services by telemedicine is:  (1) informed of the relationship between the physician and patient and the respective role of any other health care provider with respect to management of the patient; and (2) notified that he or she may decline to receive medical services by telemedicine and may withdraw from such care at any time.    Notes:     [facility] VISIT ESTABLISHED PT NOTE    REFERRING PROVIDER:  No ref. provider found    CHIEF COMPLAINT: Autoimmune Hepatitis    HPI: This is a 32 y.o. White female with PMH noted below, presenting for follow up for autoimmune hepatitis. She was previously followed by a Hepatologist in [place], and was last seen in clinic by myself in 9/2022. She was diagnosed with autoimmune hepatitis at age 7, previously treated with Azathioprine and steroids. She is now on low dose Budesonide, in addition to Azathioprine (100 mg daily - increased after last visit in September). Family history is significant for Father with a history of alcohol abuse. She also has a history of alcohol abuse, and illicit drug use; sober since 2018. She denies current illicit drug use. HCV negative on prior labs in 2015. She is immune to Hepatitis A and B, through prior vaccination.     Last liver biopsy was performed in 2006  (no evidence of significant fibrosis, per patient report). Fibroscan to restage her liver disease in 2022 was suggestive of no significant hepatic steatosis with F0-F1 fibrosis and a low likelihood of cirrhosis.   Abdominal US in 2022 showed no focal liver lesions, with mild splenomegaly (13 cm). US also showed small gallbladder cholesterol crystals, & borderline dilatation of the CBD (7 mm). Blood counts and renal function remain normal/stable.  Most recent LFT's in 2022 continue to show elevated, but improved liver enzymes (, ALT 37, AST 43); synthetic function remains intact. She admits to missed dosages around the time of last lab draw in mid November.  She is well appearing, and has no signs or symptoms of hepatic decompensation including jaundice, dark urine, abdominal distention, lower extremity, hematemesis, melena, or periods of confusion suggestive of hepatic encephalopathy. No abnormal skin rashes, or generalized joint pain.     Review of patient's allergies indicates:  No Known Allergies    Current Outpatient Medications on File Prior to Visit   Medication Sig Dispense Refill    azaTHIOprine (IMURAN) 50 mg Tab Take 2 tablets (100 mg total) by mouth once daily. 60 tablet 11    budesonide (ENTOCORT EC) 3 mg capsule Take 3 mg by mouth once daily.      buPROPion (WELLBUTRIN XL) 300 MG 24 hr tablet Take 300 mg by mouth once daily.      copper intrauterine device (PARAGARD) 380 square mm IUD 1 Intra Uterine Device by Intrauterine route once.      FLUoxetine 20 MG capsule Take 20 mg by mouth once daily. Currently taking 10 mg for 1 week the 20 mg daily      triamcinolone acetonide 0.1% (KENALOG) 0.1 % cream SMARTSI Application Topical 2-3 Times Daily       No current facility-administered medications on file prior to visit.     PMHX:  has a past medical history of Anxiety, Auto immune neutropenia, Autoimmune hepatitis, and Depression.    PSHX:  has no past surgical history on file.    FAMILY  HISTORY: Negative for liver disease, reviewed in [facility]    SOCIAL HISTORY:   Social History     Tobacco Use   Smoking Status Former    Types: Cigarettes   Smokeless Tobacco Former     Social History     Substance and Sexual Activity   Alcohol Use Not Currently    Comment: History of alcohol abuse, sober since 2018.     Social History     Substance and Sexual Activity   Drug Use Not Currently    Types: Marijuana     ROS:   GENERAL: Denies fever, chills, weight loss/gain, fatigue  HEENT: Denies headaches, dizziness, vision/hearing changes  CARDIOVASCULAR: Denies chest pain, palpitations, or edema  RESPIRATORY: Denies dyspnea, cough  GI: Denies abdominal pain, rectal bleeding, nausea, vomiting. No change in bowel pattern or color  : Denies dysuria, hematuria   SKIN: Denies rash, itching   NEURO: Denies confusion, memory loss, or mood changes  PSYCH: Denies depression or anxiety  HEME/LYMPH: Denies easy bruising or bleeding    PHYSICAL EXAM:   Friendly White female, in no acute distress; alert and oriented to person, place and time.  VITALS: There were no vitals taken for this visit.  HENT: Normocephalic, without obvious abnormality.   EYES: Sclerae anicteric.   NECK: No obvious masses.  CARDIOVASCULAR: Regular rate. No peripheral edema.  RESPIRATORY: Normal respiratory effort.  GI: Soft, non-tender, non-distended. No hepatosplenomegaly. No masses palpable. No obvious ascites.  EXTREMITIES:  No clubbing, cyanosis or edema.  SKIN: Warm and dry. No jaundice.   NEURO:  Normal gait. No asterixis.  PSYCH:  Memory intact. Thought and speech pattern appropriate. Behavior normal. No depression or anxiety noted.    DIAGNOSTIC STUDIES:    US Abdomen Complete  Narrative: EXAMINATION:  US ABDOMEN COMPLETE    CLINICAL HISTORY:  Autoimmune hepatitis    TECHNIQUE:  Complete abdominal ultrasound (including pancreas, aorta, liver, gallbladder, common bile duct, IVC, kidneys, and spleen) was performed.    COMPARISON:  Abdominal  ultrasound 05/27/2015.    FINDINGS:  Pancreas: The visualized portions of pancreas appear normal.    Aorta: No aneurysm.    Liver:  Normal in size, measuring 14.9 cm.  Heterogenous/coarse parenchymal echotexture. No focal lesions.  HRI measures 1.4.    Gallbladder: Small echogenic foci, likely cholesterol crystals.  No shadowing stones.  No wall thickening or pericholecystic fluid.  Negative sonographic Gray's sign.    Biliary system: Borderline dilated measuring 7 mm at maximum diameter.  No intrahepatic ductal dilatation.    Inferior vena cava: Normal in appearance.    Right kidney: 10.7 cm. No stones, solid mass, or hydronephrosis.    Left kidney: 11.3 cm.  No stones, solid mass, or hydronephrosis.    Spleen:  Enlarged measuring 13 x 5 cm.  Homogeneous architecture.    Miscellaneous: No ascites.  Impression: Heterogenous hepatic echotexture in patient with history of autoimmune hepatitis.    Small gallbladder cholesterol crystals.  No shadowing stones.    Borderline dilatation of the common bile duct.    Splenomegaly.    Electronically signed by resident: Darlene Mathew  Date:    09/06/2022  Time:    13:01    Electronically signed by: Gene Hodge MD  Date:    09/06/2022  Time:    13:23    FIBROSCAN 9/6/2022:    Findings  Median liver stiffness score:  5.3  CAP Reading: dB/m:  234     IQR/med %:  8  Interpretation  Fibrosis interpretation is based on medial liver stiffness - Kilopascal (kPa).     Fibrosis Stage:  F 0-1  Steatosis interpretation is based on controlled attenuation parameter - (dB/m).     Steatosis Grade:  S1    ASSESSMENT & PLAN:  32 y.o. White female with:      1. Autoimmune hepatitis  Comprehensive Metabolic Panel    CBC Auto Differential    PRO-Predict Metabolites (thiopurine therapy)      2. Long-term use of immunosuppressant medication  Comprehensive Metabolic Panel    CBC Auto Differential    PRO-Predict Metabolites (thiopurine therapy)        - Recommend Fibroscan to re-stage liver  disease every 2 years, next due 9/2024.  - Recommend ultrasound of the liver every 2 years, next due 9/2024.  - Continue Azathioprine 100 mg PO daily without missing any doses.  - Continue Budesonide 3 mg daily without missing any doses.   - Repeat labs in 1 month to monitor liver function.  - Continue to remain abstinent from alcohol and illicit drugs.   - Continue annual dermatology visits for skin cancer screenings.  - Return to clinic to be determined by lab results.    Thank you for allowing me to participate in the care of Eliseo Ybarra       Hepatology Nurse Practitioner  [facility]  12/14/2022 @ 1510    CC'ed note to:   No ref. provider found  Stevie Seaman MD

## 2023-03-13 ENCOUNTER — PATIENT MESSAGE (OUTPATIENT)
Dept: HEPATOLOGY | Facility: CLINIC | Age: 33
End: 2023-03-13
Payer: COMMERCIAL

## 2023-03-14 DIAGNOSIS — K75.4 AUTOIMMUNE HEPATITIS: Primary | ICD-10-CM

## 2023-03-14 DIAGNOSIS — Z79.60 LONG-TERM USE OF IMMUNOSUPPRESSANT MEDICATION: ICD-10-CM

## 2023-03-14 RX ORDER — BUDESONIDE 3 MG/1
9 CAPSULE, COATED PELLETS ORAL DAILY
Qty: 90 CAPSULE | Refills: 11 | Status: SHIPPED | OUTPATIENT
Start: 2023-03-14 | End: 2023-06-21 | Stop reason: SDUPTHER

## 2023-06-20 NOTE — PATIENT INSTRUCTIONS
- Recommend Fibroscan to re-stage liver disease every 2 years, next due 9/2024.  - Recommend ultrasound of the liver every 2 years, next due 9/2024.  - Continue Azathioprine 100 mg PO daily without missing any doses.  - Continue Budesonide 3 mg daily without missing any doses.   - Repeat labs in 1 month to monitor liver function.  - Continue to remain abstinent from alcohol and illicit drugs.   - Continue annual dermatology visits for skin cancer screenings.  - Return to clinic to be determined by lab results.   Isotretinoin Pregnancy And Lactation Text: This medication is Pregnancy Category X and is considered extremely dangerous during pregnancy. It is unknown if it is excreted in breast milk.

## 2023-06-21 ENCOUNTER — OFFICE VISIT (OUTPATIENT)
Dept: HEPATOLOGY | Facility: CLINIC | Age: 33
End: 2023-06-21
Payer: COMMERCIAL

## 2023-06-21 DIAGNOSIS — K75.4 AUTOIMMUNE HEPATITIS: Primary | ICD-10-CM

## 2023-06-21 DIAGNOSIS — Z79.60 LONG-TERM USE OF IMMUNOSUPPRESSANT MEDICATION: ICD-10-CM

## 2023-06-21 PROCEDURE — 1159F PR MEDICATION LIST DOCUMENTED IN MEDICAL RECORD: ICD-10-PCS | Mod: CPTII,95,, | Performed by: NURSE PRACTITIONER

## 2023-06-21 PROCEDURE — 1159F MED LIST DOCD IN RCRD: CPT | Mod: CPTII,95,, | Performed by: NURSE PRACTITIONER

## 2023-06-21 PROCEDURE — 1160F RVW MEDS BY RX/DR IN RCRD: CPT | Mod: CPTII,95,, | Performed by: NURSE PRACTITIONER

## 2023-06-21 PROCEDURE — 1160F PR REVIEW ALL MEDS BY PRESCRIBER/CLIN PHARMACIST DOCUMENTED: ICD-10-PCS | Mod: CPTII,95,, | Performed by: NURSE PRACTITIONER

## 2023-06-21 PROCEDURE — 99213 PR OFFICE/OUTPT VISIT, EST, LEVL III, 20-29 MIN: ICD-10-PCS | Mod: 95,,, | Performed by: NURSE PRACTITIONER

## 2023-06-21 PROCEDURE — 99213 OFFICE O/P EST LOW 20 MIN: CPT | Mod: 95,,, | Performed by: NURSE PRACTITIONER

## 2023-06-21 RX ORDER — AZATHIOPRINE 50 MG/1
100 TABLET ORAL DAILY
Qty: 180 TABLET | Refills: 3 | Status: SHIPPED | OUTPATIENT
Start: 2023-06-21 | End: 2024-06-20

## 2023-06-21 RX ORDER — FLUOXETINE HYDROCHLORIDE 40 MG/1
40 CAPSULE ORAL
COMMUNITY
Start: 2023-05-16

## 2023-06-21 RX ORDER — BUDESONIDE 3 MG/1
9 CAPSULE, COATED PELLETS ORAL DAILY
Qty: 270 CAPSULE | Refills: 3 | Status: SHIPPED | OUTPATIENT
Start: 2023-06-21 | End: 2023-12-13 | Stop reason: SDUPTHER

## 2023-06-21 RX ORDER — COVID-19 MOLECULAR TEST ASSAY
KIT MISCELLANEOUS
COMMUNITY
Start: 2023-01-18

## 2023-06-21 NOTE — PROGRESS NOTES
The patient location is: [place], [place].  The chief complaint leading to consultation is: Autoimmune Hepatitis     Visit type: audiovisual    Face to Face time with patient: 10  20 minutes of total time spent on the encounter, which includes face to face time and non-face to face time preparing to see the patient (eg, review of tests), Obtaining and/or reviewing separately obtained history, Documenting clinical information in the electronic or other health record, Independently interpreting results (not separately reported) and communicating results to the patient/family/caregiver, or Care coordination (not separately reported).     Each patient to whom he or she provides medical services by telemedicine is:  (1) informed of the relationship between the physician and patient and the respective role of any other health care provider with respect to management of the patient; and (2) notified that he or she may decline to receive medical services by telemedicine and may withdraw from such care at any time.    Notes:     [facility] VISIT ESTABLISHED PT NOTE    REFERRING PROVIDER:  No ref. provider found    CHIEF COMPLAINT: Autoimmune Hepatitis    HPI: This is a 32 y.o. White female with PMH noted below, presenting for follow up for autoimmune hepatitis. She was previously followed by a Hepatologist in [place], and was last seen in clinic by myself in 12/2022. She was diagnosed with autoimmune hepatitis at age 7, previously treated with Azathioprine and steroids. She is now on Budesonide 9 mg daily (increased after last visit in 12/2022), in addition to Azathioprine (100 mg daily - dose increased after visit in September 2022).   Family history is significant for Father with a history of alcohol abuse. She also has a history of alcohol abuse, and illicit drug use; sober since 2018. She denies current illicit drug use. HCV negative on prior labs in 2015. She is immune to Hepatitis A and B, through prior  vaccination.     Last liver biopsy was performed in  (no evidence of significant fibrosis, per patient report). Fibroscan to restage her liver disease in 2022 was suggestive of no significant hepatic steatosis with F0-F1 fibrosis and a low likelihood of cirrhosis.   Abdominal US in 2022 showed no focal liver lesions, with mild splenomegaly (13 cm). US also showed small gallbladder cholesterol crystals, & borderline dilatation of the CBD (7 mm). Blood counts and renal function remain normal/stable.  Most recent LFT's in 2022 continue to show elevated, but improved liver enzymes (, ALT 37, AST 43); synthetic function remains intact. She is overdue for follow up labs. She is still taking Azathioprine 100 mg daily, but has been out of Budesonide for approximately 1 week. She is well appearing, and has no signs or symptoms of hepatic decompensation including jaundice, dark urine, abdominal distention, lower extremity, hematemesis, melena, or periods of confusion suggestive of hepatic encephalopathy. No abnormal skin rashes, or generalized joint pain.     Review of patient's allergies indicates:  No Known Allergies    Current Outpatient Medications on File Prior to Visit   Medication Sig Dispense Refill    BINAXNOW COVID-19 AG SELF TEST Kit TEST AS DIRECTED TODAY      buPROPion (WELLBUTRIN XL) 300 MG 24 hr tablet Take 300 mg by mouth once daily.      copper intrauterine device (PARAGARD) 380 square mm IUD 1 Intra Uterine Device by Intrauterine route once.      FLUoxetine 20 MG capsule Take 20 mg by mouth once daily. Currently taking 10 mg for 1 week the 20 mg daily      FLUoxetine 40 MG capsule Take 40 mg by mouth.      triamcinolone acetonide 0.1% (KENALOG) 0.1 % cream SMARTSI Application Topical 2-3 Times Daily      [DISCONTINUED] azaTHIOprine (IMURAN) 50 mg Tab Take 2 tablets (100 mg total) by mouth once daily. 60 tablet 11    [DISCONTINUED] budesonide (ENTOCORT EC) 3 mg capsule Take 3 capsules (9  mg total) by mouth once daily. 90 capsule 11     No current facility-administered medications on file prior to visit.     PMHX:  has a past medical history of Anxiety, Auto immune neutropenia, Autoimmune hepatitis, and Depression.    PSHX:  has no past surgical history on file.    FAMILY HISTORY: Negative for liver disease, reviewed in [facility]    SOCIAL HISTORY:   Social History     Tobacco Use   Smoking Status Former    Types: Cigarettes   Smokeless Tobacco Former     Social History     Substance and Sexual Activity   Alcohol Use Not Currently    Comment: History of alcohol abuse, sober since 2018.     Social History     Substance and Sexual Activity   Drug Use Not Currently    Types: Marijuana     ROS:   GENERAL: Denies fever, chills, weight loss/gain, fatigue  HEENT: Denies headaches, dizziness, vision/hearing changes  CARDIOVASCULAR: Denies chest pain, palpitations, or edema  RESPIRATORY: Denies dyspnea, cough  GI: Denies abdominal pain, rectal bleeding, nausea, vomiting. No change in bowel pattern or color  : Denies dysuria, hematuria   SKIN: Denies rash, itching   NEURO: Denies confusion, memory loss, or mood changes  PSYCH: Denies depression or anxiety  HEME/LYMPH: Denies easy bruising or bleeding    PHYSICAL EXAM:   Friendly White female, in no acute distress; alert and oriented to person, place and time.  VITALS: There were no vitals taken for this visit. (Not done - Telehealth visit).   HENT: Normocephalic, without obvious abnormality.   EYES: Sclerae anicteric.   NECK: No obvious masses.  CARDIOVASCULAR: Unable to assess.  RESPIRATORY: Normal respiratory effort.  GI: Unable to assess.  EXTREMITIES: Unable to assess.  SKIN:  No jaundice.   NEURO:  No asterixis.  PSYCH:  Memory intact. Thought and speech pattern appropriate. Behavior normal. No depression or anxiety noted.    DIAGNOSTIC STUDIES:    US Abdomen Complete  Narrative: EXAMINATION:  US ABDOMEN COMPLETE    CLINICAL HISTORY:  Autoimmune  hepatitis    TECHNIQUE:  Complete abdominal ultrasound (including pancreas, aorta, liver, gallbladder, common bile duct, IVC, kidneys, and spleen) was performed.    COMPARISON:  Abdominal ultrasound 05/27/2015.    FINDINGS:  Pancreas: The visualized portions of pancreas appear normal.    Aorta: No aneurysm.    Liver:  Normal in size, measuring 14.9 cm.  Heterogenous/coarse parenchymal echotexture. No focal lesions.  HRI measures 1.4.    Gallbladder: Small echogenic foci, likely cholesterol crystals.  No shadowing stones.  No wall thickening or pericholecystic fluid.  Negative sonographic Gray's sign.    Biliary system: Borderline dilated measuring 7 mm at maximum diameter.  No intrahepatic ductal dilatation.    Inferior vena cava: Normal in appearance.    Right kidney: 10.7 cm. No stones, solid mass, or hydronephrosis.    Left kidney: 11.3 cm.  No stones, solid mass, or hydronephrosis.    Spleen:  Enlarged measuring 13 x 5 cm.  Homogeneous architecture.    Miscellaneous: No ascites.  Impression: Heterogenous hepatic echotexture in patient with history of autoimmune hepatitis.    Small gallbladder cholesterol crystals.  No shadowing stones.    Borderline dilatation of the common bile duct.    Splenomegaly.    Electronically signed by resident: Darlene Mathew  Date:    09/06/2022  Time:    13:01    Electronically signed by: Gene Hodge MD  Date:    09/06/2022  Time:    13:23    FIBROSCAN 9/6/2022:    Findings  Median liver stiffness score:  5.3  CAP Reading: dB/m:  234     IQR/med %:  8  Interpretation  Fibrosis interpretation is based on medial liver stiffness - Kilopascal (kPa).     Fibrosis Stage:  F 0-1  Steatosis interpretation is based on controlled attenuation parameter - (dB/m).     Steatosis Grade:  S1    ASSESSMENT & PLAN:  32 y.o. White female with:      1. Autoimmune hepatitis  budesonide (ENTOCORT EC) 3 mg capsule    azaTHIOprine (IMURAN) 50 mg Tab    Comprehensive Metabolic Panel    CBC Auto  Differential    PRO-Predict Metabolites (thiopurine therapy)      2. Long-term use of immunosuppressant medication  budesonide (ENTOCORT EC) 3 mg capsule    azaTHIOprine (IMURAN) 50 mg Tab    Comprehensive Metabolic Panel    CBC Auto Differential    PRO-Predict Metabolites (thiopurine therapy)        - Recommend Fibroscan to re-stage liver disease every 2 years, next due 9/2024.  - Recommend ultrasound of the liver every 2 years, next due 9/2024.  - Continue Azathioprine 100 mg PO daily without missing any doses.  - Restart Budesonide 9 mg daily without missing any doses.   - Repeat labs in 1 week to monitor liver function.  - Continue to remain abstinent from alcohol and illicit drugs.   - Continue annual dermatology visits for skin cancer screenings.  - Return to clinic to be determined by lab results.    Thank you for allowing me to participate in the care of Eliseo Ybarra       Hepatology Nurse Practitioner  [facility]    CC'ed note to:   No ref. provider found  Stevie Seaman MD

## 2023-06-21 NOTE — PATIENT INSTRUCTIONS
- Recommend Fibroscan to re-stage liver disease every 2 years, next due 9/2024.  - Recommend ultrasound of the liver every 2 years, next due 9/2024.  - Continue Azathioprine 100 mg PO daily without missing any doses.  - Restart Budesonide 9 mg daily without missing any doses.   - Repeat labs in 1 week to monitor liver function.  - Continue to remain abstinent from alcohol and illicit drugs.   - Continue annual dermatology visits for skin cancer screenings.  - Return to clinic to be determined by lab results.

## 2023-06-29 ENCOUNTER — TELEPHONE (OUTPATIENT)
Dept: HEPATOLOGY | Facility: CLINIC | Age: 33
End: 2023-06-29
Payer: COMMERCIAL

## 2023-06-29 ENCOUNTER — LAB VISIT (OUTPATIENT)
Dept: LAB | Facility: HOSPITAL | Age: 33
End: 2023-06-29
Attending: NURSE PRACTITIONER
Payer: COMMERCIAL

## 2023-06-29 DIAGNOSIS — K75.4 AUTOIMMUNE HEPATITIS: ICD-10-CM

## 2023-06-29 DIAGNOSIS — Z79.60 LONG-TERM USE OF IMMUNOSUPPRESSANT MEDICATION: ICD-10-CM

## 2023-06-29 LAB
ALBUMIN SERPL BCP-MCNC: 4 G/DL (ref 3.5–5.2)
ALP SERPL-CCNC: 195 U/L (ref 55–135)
ALT SERPL W/O P-5'-P-CCNC: 64 U/L (ref 10–44)
ANION GAP SERPL CALC-SCNC: 6 MMOL/L (ref 8–16)
AST SERPL-CCNC: 61 U/L (ref 10–40)
BASOPHILS # BLD AUTO: 0.03 K/UL (ref 0–0.2)
BASOPHILS NFR BLD: 0.8 % (ref 0–1.9)
BILIRUB SERPL-MCNC: 0.5 MG/DL (ref 0.1–1)
BUN SERPL-MCNC: 10 MG/DL (ref 6–20)
CALCIUM SERPL-MCNC: 9.2 MG/DL (ref 8.7–10.5)
CHLORIDE SERPL-SCNC: 107 MMOL/L (ref 95–110)
CO2 SERPL-SCNC: 26 MMOL/L (ref 23–29)
CREAT SERPL-MCNC: 0.9 MG/DL (ref 0.5–1.4)
DIFFERENTIAL METHOD: ABNORMAL
EOSINOPHIL # BLD AUTO: 0.2 K/UL (ref 0–0.5)
EOSINOPHIL NFR BLD: 5.7 % (ref 0–8)
ERYTHROCYTE [DISTWIDTH] IN BLOOD BY AUTOMATED COUNT: 15.7 % (ref 11.5–14.5)
EST. GFR  (NO RACE VARIABLE): >60 ML/MIN/1.73 M^2
GLUCOSE SERPL-MCNC: 92 MG/DL (ref 70–110)
HCT VFR BLD AUTO: 32.5 % (ref 37–48.5)
HGB BLD-MCNC: 10.5 G/DL (ref 12–16)
IMM GRANULOCYTES # BLD AUTO: 0.05 K/UL (ref 0–0.04)
IMM GRANULOCYTES NFR BLD AUTO: 1.3 % (ref 0–0.5)
LYMPHOCYTES # BLD AUTO: 0.5 K/UL (ref 1–4.8)
LYMPHOCYTES NFR BLD: 14.1 % (ref 18–48)
MCH RBC QN AUTO: 31.1 PG (ref 27–31)
MCHC RBC AUTO-ENTMCNC: 32.3 G/DL (ref 32–36)
MCV RBC AUTO: 96 FL (ref 82–98)
MONOCYTES # BLD AUTO: 0.2 K/UL (ref 0.3–1)
MONOCYTES NFR BLD: 6.3 % (ref 4–15)
NEUTROPHILS # BLD AUTO: 2.8 K/UL (ref 1.8–7.7)
NEUTROPHILS NFR BLD: 71.8 % (ref 38–73)
NRBC BLD-RTO: 0 /100 WBC
PLATELET # BLD AUTO: 95 K/UL (ref 150–450)
PMV BLD AUTO: 10.2 FL (ref 9.2–12.9)
POTASSIUM SERPL-SCNC: 4.6 MMOL/L (ref 3.5–5.1)
PROT SERPL-MCNC: 7 G/DL (ref 6–8.4)
RBC # BLD AUTO: 3.38 M/UL (ref 4–5.4)
SODIUM SERPL-SCNC: 139 MMOL/L (ref 136–145)
WBC # BLD AUTO: 3.83 K/UL (ref 3.9–12.7)

## 2023-06-29 PROCEDURE — 36415 COLL VENOUS BLD VENIPUNCTURE: CPT | Performed by: NURSE PRACTITIONER

## 2023-06-29 PROCEDURE — 80299 QUANTITATIVE ASSAY DRUG: CPT | Performed by: NURSE PRACTITIONER

## 2023-06-29 PROCEDURE — 85025 COMPLETE CBC W/AUTO DIFF WBC: CPT | Performed by: NURSE PRACTITIONER

## 2023-06-29 PROCEDURE — 80053 COMPREHEN METABOLIC PANEL: CPT | Performed by: NURSE PRACTITIONER

## 2023-06-29 NOTE — TELEPHONE ENCOUNTER
----- Message from Kelly Claire NP sent at 6/29/2023 12:01 PM CDT -----  Please arrange repeat labs in 1 month. Thanks!

## 2023-07-06 LAB
6-TGN ENTSUB RBC: 134 PMOL/8X10(8)RBC (ref 235–450)
6MMP ENTSUB RBC: 826 PMOL/8X10(8)RBC

## 2023-09-04 ENCOUNTER — PATIENT MESSAGE (OUTPATIENT)
Dept: INTERNAL MEDICINE | Facility: CLINIC | Age: 33
End: 2023-09-04
Payer: COMMERCIAL

## 2023-09-14 NOTE — TELEPHONE ENCOUNTER
I could not get any information from [facility], there is nothing at check out and your message sent to the pt stated:    DIMITRIOS Philip 8 days ago     TW  Good morning, we have a copy of your records available. Would you like to pick it up or would you like us to mail it to you.

## 2023-09-15 ENCOUNTER — LAB VISIT (OUTPATIENT)
Dept: LAB | Facility: HOSPITAL | Age: 33
End: 2023-09-15
Payer: COMMERCIAL

## 2023-09-15 DIAGNOSIS — Z79.60 LONG-TERM USE OF IMMUNOSUPPRESSANT MEDICATION: ICD-10-CM

## 2023-09-15 DIAGNOSIS — K75.4 AUTOIMMUNE HEPATITIS: ICD-10-CM

## 2023-09-15 LAB
ALBUMIN SERPL BCP-MCNC: 4.1 G/DL (ref 3.5–5.2)
ALP SERPL-CCNC: 195 U/L (ref 55–135)
ALT SERPL W/O P-5'-P-CCNC: 138 U/L (ref 10–44)
ANION GAP SERPL CALC-SCNC: 6 MMOL/L (ref 8–16)
AST SERPL-CCNC: 114 U/L (ref 10–40)
BASOPHILS # BLD AUTO: 0.02 K/UL (ref 0–0.2)
BASOPHILS NFR BLD: 0.5 % (ref 0–1.9)
BILIRUB SERPL-MCNC: 0.4 MG/DL (ref 0.1–1)
BUN SERPL-MCNC: 11 MG/DL (ref 6–20)
CALCIUM SERPL-MCNC: 9.4 MG/DL (ref 8.7–10.5)
CHLORIDE SERPL-SCNC: 105 MMOL/L (ref 95–110)
CO2 SERPL-SCNC: 27 MMOL/L (ref 23–29)
CREAT SERPL-MCNC: 0.7 MG/DL (ref 0.5–1.4)
DIFFERENTIAL METHOD: ABNORMAL
EOSINOPHIL # BLD AUTO: 0.2 K/UL (ref 0–0.5)
EOSINOPHIL NFR BLD: 3.9 % (ref 0–8)
ERYTHROCYTE [DISTWIDTH] IN BLOOD BY AUTOMATED COUNT: 14.7 % (ref 11.5–14.5)
EST. GFR  (NO RACE VARIABLE): >60 ML/MIN/1.73 M^2
GLUCOSE SERPL-MCNC: 80 MG/DL (ref 70–110)
HCT VFR BLD AUTO: 34.1 % (ref 37–48.5)
HGB BLD-MCNC: 10.9 G/DL (ref 12–16)
IMM GRANULOCYTES # BLD AUTO: 0.02 K/UL (ref 0–0.04)
IMM GRANULOCYTES NFR BLD AUTO: 0.5 % (ref 0–0.5)
LYMPHOCYTES # BLD AUTO: 0.8 K/UL (ref 1–4.8)
LYMPHOCYTES NFR BLD: 20.9 % (ref 18–48)
MCH RBC QN AUTO: 30.5 PG (ref 27–31)
MCHC RBC AUTO-ENTMCNC: 32 G/DL (ref 32–36)
MCV RBC AUTO: 96 FL (ref 82–98)
MONOCYTES # BLD AUTO: 0.2 K/UL (ref 0.3–1)
MONOCYTES NFR BLD: 5.7 % (ref 4–15)
NEUTROPHILS # BLD AUTO: 2.7 K/UL (ref 1.8–7.7)
NEUTROPHILS NFR BLD: 68.5 % (ref 38–73)
NRBC BLD-RTO: 0 /100 WBC
PLATELET # BLD AUTO: 86 K/UL (ref 150–450)
PMV BLD AUTO: 10 FL (ref 9.2–12.9)
POTASSIUM SERPL-SCNC: 4 MMOL/L (ref 3.5–5.1)
PROT SERPL-MCNC: 7.3 G/DL (ref 6–8.4)
RBC # BLD AUTO: 3.57 M/UL (ref 4–5.4)
SODIUM SERPL-SCNC: 138 MMOL/L (ref 136–145)
WBC # BLD AUTO: 3.88 K/UL (ref 3.9–12.7)

## 2023-09-15 PROCEDURE — 80053 COMPREHEN METABOLIC PANEL: CPT | Performed by: NURSE PRACTITIONER

## 2023-09-15 PROCEDURE — 36415 COLL VENOUS BLD VENIPUNCTURE: CPT | Performed by: NURSE PRACTITIONER

## 2023-09-15 PROCEDURE — 85025 COMPLETE CBC W/AUTO DIFF WBC: CPT | Performed by: NURSE PRACTITIONER

## 2023-09-21 ENCOUNTER — TELEPHONE (OUTPATIENT)
Dept: HEPATOLOGY | Facility: CLINIC | Age: 33
End: 2023-09-21
Payer: COMMERCIAL

## 2023-09-21 NOTE — TELEPHONE ENCOUNTER
----- Message from Kelly Claire NP sent at 9/21/2023  9:22 AM CDT -----  Please contact patient to arrange a f/u visit with me (virtual visit okay). Thanks!

## 2023-11-16 ENCOUNTER — OFFICE VISIT (OUTPATIENT)
Dept: HEPATOLOGY | Facility: CLINIC | Age: 33
End: 2023-11-16
Payer: COMMERCIAL

## 2023-11-16 ENCOUNTER — LAB VISIT (OUTPATIENT)
Dept: LAB | Facility: HOSPITAL | Age: 33
End: 2023-11-16
Payer: COMMERCIAL

## 2023-11-16 VITALS — HEIGHT: 67 IN | WEIGHT: 135 LBS | BODY MASS INDEX: 21.19 KG/M2

## 2023-11-16 DIAGNOSIS — Z79.60 LONG-TERM USE OF IMMUNOSUPPRESSANT MEDICATION: ICD-10-CM

## 2023-11-16 DIAGNOSIS — K75.4 AUTOIMMUNE HEPATITIS: ICD-10-CM

## 2023-11-16 DIAGNOSIS — K75.4 AUTOIMMUNE HEPATITIS: Primary | ICD-10-CM

## 2023-11-16 DIAGNOSIS — R74.8 ELEVATED LIVER ENZYMES: ICD-10-CM

## 2023-11-16 LAB
ALBUMIN SERPL BCP-MCNC: 4 G/DL (ref 3.5–5.2)
ALP SERPL-CCNC: 116 U/L (ref 55–135)
ALT SERPL W/O P-5'-P-CCNC: 34 U/L (ref 10–44)
ANION GAP SERPL CALC-SCNC: 7 MMOL/L (ref 8–16)
AST SERPL-CCNC: 40 U/L (ref 10–40)
BASOPHILS # BLD AUTO: 0.03 K/UL (ref 0–0.2)
BASOPHILS NFR BLD: 0.6 % (ref 0–1.9)
BILIRUB SERPL-MCNC: 0.2 MG/DL (ref 0.1–1)
BUN SERPL-MCNC: 11 MG/DL (ref 6–20)
CALCIUM SERPL-MCNC: 9.4 MG/DL (ref 8.7–10.5)
CHLORIDE SERPL-SCNC: 107 MMOL/L (ref 95–110)
CO2 SERPL-SCNC: 24 MMOL/L (ref 23–29)
CREAT SERPL-MCNC: 1 MG/DL (ref 0.5–1.4)
DIFFERENTIAL METHOD: ABNORMAL
EOSINOPHIL # BLD AUTO: 0.2 K/UL (ref 0–0.5)
EOSINOPHIL NFR BLD: 3.9 % (ref 0–8)
ERYTHROCYTE [DISTWIDTH] IN BLOOD BY AUTOMATED COUNT: 14.6 % (ref 11.5–14.5)
EST. GFR  (NO RACE VARIABLE): >60 ML/MIN/1.73 M^2
GLUCOSE SERPL-MCNC: 104 MG/DL (ref 70–110)
HCT VFR BLD AUTO: 33.8 % (ref 37–48.5)
HGB BLD-MCNC: 11.2 G/DL (ref 12–16)
IMM GRANULOCYTES # BLD AUTO: 0.02 K/UL (ref 0–0.04)
IMM GRANULOCYTES NFR BLD AUTO: 0.4 % (ref 0–0.5)
INR PPP: 1.1 (ref 0.8–1.2)
LYMPHOCYTES # BLD AUTO: 1.1 K/UL (ref 1–4.8)
LYMPHOCYTES NFR BLD: 24.3 % (ref 18–48)
MCH RBC QN AUTO: 30.8 PG (ref 27–31)
MCHC RBC AUTO-ENTMCNC: 33.1 G/DL (ref 32–36)
MCV RBC AUTO: 93 FL (ref 82–98)
MONOCYTES # BLD AUTO: 0.3 K/UL (ref 0.3–1)
MONOCYTES NFR BLD: 6.9 % (ref 4–15)
NEUTROPHILS # BLD AUTO: 3 K/UL (ref 1.8–7.7)
NEUTROPHILS NFR BLD: 63.9 % (ref 38–73)
NRBC BLD-RTO: 0 /100 WBC
PLATELET # BLD AUTO: 79 K/UL (ref 150–450)
PMV BLD AUTO: 10.6 FL (ref 9.2–12.9)
POTASSIUM SERPL-SCNC: 4.3 MMOL/L (ref 3.5–5.1)
PROT SERPL-MCNC: 7.1 G/DL (ref 6–8.4)
PROTHROMBIN TIME: 11.3 SEC (ref 9–12.5)
RBC # BLD AUTO: 3.64 M/UL (ref 4–5.4)
SODIUM SERPL-SCNC: 138 MMOL/L (ref 136–145)
WBC # BLD AUTO: 4.65 K/UL (ref 3.9–12.7)

## 2023-11-16 PROCEDURE — 85610 PROTHROMBIN TIME: CPT | Performed by: NURSE PRACTITIONER

## 2023-11-16 PROCEDURE — 99214 OFFICE O/P EST MOD 30 MIN: CPT | Mod: S$GLB,,, | Performed by: NURSE PRACTITIONER

## 2023-11-16 PROCEDURE — 3008F PR BODY MASS INDEX (BMI) DOCUMENTED: ICD-10-PCS | Mod: CPTII,S$GLB,, | Performed by: NURSE PRACTITIONER

## 2023-11-16 PROCEDURE — 3008F BODY MASS INDEX DOCD: CPT | Mod: CPTII,S$GLB,, | Performed by: NURSE PRACTITIONER

## 2023-11-16 PROCEDURE — 99214 PR OFFICE/OUTPT VISIT, EST, LEVL IV, 30-39 MIN: ICD-10-PCS | Mod: S$GLB,,, | Performed by: NURSE PRACTITIONER

## 2023-11-16 PROCEDURE — 80299 QUANTITATIVE ASSAY DRUG: CPT | Performed by: NURSE PRACTITIONER

## 2023-11-16 PROCEDURE — 80053 COMPREHEN METABOLIC PANEL: CPT | Performed by: NURSE PRACTITIONER

## 2023-11-16 PROCEDURE — 85025 COMPLETE CBC W/AUTO DIFF WBC: CPT | Performed by: NURSE PRACTITIONER

## 2023-11-16 PROCEDURE — 99999 PR PBB SHADOW E&M-EST. PATIENT-LVL IV: CPT | Mod: PBBFAC,,, | Performed by: NURSE PRACTITIONER

## 2023-11-16 PROCEDURE — 99999 PR PBB SHADOW E&M-EST. PATIENT-LVL IV: ICD-10-PCS | Mod: PBBFAC,,, | Performed by: NURSE PRACTITIONER

## 2023-11-16 PROCEDURE — 1159F PR MEDICATION LIST DOCUMENTED IN MEDICAL RECORD: ICD-10-PCS | Mod: CPTII,S$GLB,, | Performed by: NURSE PRACTITIONER

## 2023-11-16 PROCEDURE — 36415 COLL VENOUS BLD VENIPUNCTURE: CPT | Performed by: NURSE PRACTITIONER

## 2023-11-16 PROCEDURE — 1160F RVW MEDS BY RX/DR IN RCRD: CPT | Mod: CPTII,S$GLB,, | Performed by: NURSE PRACTITIONER

## 2023-11-16 PROCEDURE — 1159F MED LIST DOCD IN RCRD: CPT | Mod: CPTII,S$GLB,, | Performed by: NURSE PRACTITIONER

## 2023-11-16 PROCEDURE — 1160F PR REVIEW ALL MEDS BY PRESCRIBER/CLIN PHARMACIST DOCUMENTED: ICD-10-PCS | Mod: CPTII,S$GLB,, | Performed by: NURSE PRACTITIONER

## 2023-11-16 NOTE — PATIENT INSTRUCTIONS
Labs today.  Call Interventional Radiology at [phone] to schedule repeat liver biopsy.   Return to clinic 1-2 weeks post biopsy to review pathology results.

## 2023-11-16 NOTE — PROGRESS NOTES
[facility] VISIT ESTABLISHED PT NOTE    REFERRING PROVIDER:  No ref. provider found    CHIEF COMPLAINT: Autoimmune Hepatitis    HPI: This is a 33 y.o. White female with PMH noted below, presenting for follow up for autoimmune hepatitis. She was previously followed by a Hepatologist in [place], and was last seen in clinic by myself in 6/2023. She was diagnosed with autoimmune hepatitis at age 7, previously treated with Azathioprine and steroids. She is now on Budesonide 9 mg daily (increased after last visit in 12/2022), in addition to Azathioprine (100 mg daily - dose increased after visit in September 2022).   Family history is significant for Father with a history of alcohol abuse. She also has a history of alcohol abuse, and illicit drug use; sober since 2018. She denies current illicit drug use. HCV negative on prior labs in 2015. She is immune to Hepatitis A and B, through prior vaccination.     Last liver biopsy was performed in 2006 (no evidence of significant fibrosis, per patient report). Fibroscan to restage her liver disease in 9/2022 was suggestive of no significant hepatic steatosis with F0-F1 fibrosis and a low likelihood of cirrhosis.   Abdominal US in 9/2022 showed no focal liver lesions, with mild splenomegaly (13 cm). US also showed small gallbladder cholesterol crystals, & borderline dilatation of the CBD (7 mm). Synthetic function remains intact, but transaminases and ALP remain significantly elevated, as below:     Latest Reference Range & Units 08/25/22 15:17 10/20/22 08:54 11/18/22 08:52 06/29/23 08:34 09/15/23 12:36   ALP 55 - 135 U/L 266 (H) 273 (H) 200 (H) 195 (H) 195 (H)   PROTEIN TOTAL 6.0 - 8.4 g/dL 7.7 7.7 7.1 7.0 7.3   Albumin 3.5 - 5.2 g/dL 4.2 4.2 4.0 4.0 4.1   BILIRUBIN TOTAL 0.1 - 1.0 mg/dL 0.4 0.5 0.4 0.5 0.4   AST 10 - 40 U/L 99 (H) 81 (H) 43 (H) 61 (H) 114 (H)   ALT 10 - 44 U/L 110 (H) 65 (H) 37 64 (H) 138 (H)     Additionally, platelet counts are low, and have been  trending down over the year, which raises the concern for progression in fibrosis, as Thiopurine metabolite levels have been low/normal (last checked in 6/2023).      FIB-4 Calculation: 3.61 at 11/16/2023  4:35 PM   Calculated from:  Last SGOT/AST : 114 at 11/16/2023  4:35 PM  Last SGPT/ALT: 138 at 11/16/2023  4:35 PM   Platelets: 86 at 11/16/2023  4:35 PM   Age: 33 y.o.     FIB-4 below 1.30 is considered as low-risk for advanced fibrosis  FIB-4 over 2.67 is considered as high-risk for advanced fibrosis  FIB-4 values between 1.30 and 2.67 are considered as intermediate-risk of advanced fibrosis for ages 36-64.     For ages > 64 the cut-off for low-risk goes to < 2.  This is a screening tool and clinical judgement should be used in the interpretation of these results.    She is still taking Azathioprine 100 mg daily, and resumed Budesonide 9 mg daily after last visit in June. She admits to occasional missed doses, but has been compliant with treatment otherwise. She is well appearing, and has no signs or symptoms of hepatic decompensation including jaundice, dark urine, abdominal distention, lower extremity, hematemesis, melena, or periods of confusion suggestive of hepatic encephalopathy. No abnormal skin rashes, or generalized joint pain.     Review of patient's allergies indicates:  No Known Allergies    Current Outpatient Medications on File Prior to Visit   Medication Sig Dispense Refill    azaTHIOprine (IMURAN) 50 mg Tab Take 2 tablets (100 mg total) by mouth once daily. 180 tablet 3    BINAXNOW COVID-19 AG SELF TEST Kit TEST AS DIRECTED TODAY      budesonide (ENTOCORT EC) 3 mg capsule Take 3 capsules (9 mg total) by mouth once daily. 270 capsule 3    buPROPion (WELLBUTRIN XL) 300 MG 24 hr tablet Take 300 mg by mouth once daily.      copper intrauterine device (PARAGARD) 380 square mm IUD 1 Intra Uterine Device by Intrauterine route once.      FLUoxetine 20 MG capsule Take 20 mg by mouth once daily. Currently  "taking 10 mg for 1 week the 20 mg daily      FLUoxetine 40 MG capsule Take 40 mg by mouth.      triamcinolone acetonide 0.1% (KENALOG) 0.1 % cream SMARTSI Application Topical 2-3 Times Daily       No current facility-administered medications on file prior to visit.     PMHX:  has a past medical history of Anxiety, Auto immune neutropenia, Autoimmune hepatitis, and Depression.    PSHX:  has no past surgical history on file.    FAMILY HISTORY: Negative for liver disease, reviewed in EPIC    SOCIAL HISTORY:   Social History     Tobacco Use   Smoking Status Former    Types: Cigarettes   Smokeless Tobacco Former     Social History     Substance and Sexual Activity   Alcohol Use Not Currently    Comment: History of alcohol abuse, sober since 2018.     Social History     Substance and Sexual Activity   Drug Use Not Currently    Types: Marijuana     ROS:   GENERAL: Denies fever, chills, weight loss/gain, fatigue  HEENT: Denies headaches, dizziness, vision/hearing changes  CARDIOVASCULAR: Denies chest pain, palpitations, or edema  RESPIRATORY: Denies dyspnea, cough  GI: Denies abdominal pain, rectal bleeding, nausea, vomiting. No change in bowel pattern or color  : Denies dysuria, hematuria   SKIN: Denies rash, itching   NEURO: Denies confusion, memory loss, or mood changes  PSYCH: Denies depression or anxiety  HEME/LYMPH: Denies easy bruising or bleeding    PHYSICAL EXAM:   Friendly White female, in no acute distress; alert and oriented to person, place and time.  VITALS: Ht 5' 7" (1.702 m)   Wt 61.2 kg (135 lb)   BMI 21.14 kg/m²    HENT: Normocephalic, without obvious abnormality.   EYES: Sclerae anicteric.   NECK: No obvious masses.  CARDIOVASCULAR: No peripheral edema.  RESPIRATORY: Normal respiratory effort.  GI: Non-distended abdomen.  EXTREMITIES: No peripheral edema.   SKIN:  No jaundice.   NEURO:  No asterixis.  PSYCH:  Memory intact. Thought and speech pattern appropriate. Behavior normal. No depression or " anxiety noted.    DIAGNOSTIC STUDIES:    US Abdomen Complete  Narrative: EXAMINATION:  US ABDOMEN COMPLETE    CLINICAL HISTORY:  Autoimmune hepatitis    TECHNIQUE:  Complete abdominal ultrasound (including pancreas, aorta, liver, gallbladder, common bile duct, IVC, kidneys, and spleen) was performed.    COMPARISON:  Abdominal ultrasound 05/27/2015.    FINDINGS:  Pancreas: The visualized portions of pancreas appear normal.    Aorta: No aneurysm.    Liver:  Normal in size, measuring 14.9 cm.  Heterogenous/coarse parenchymal echotexture. No focal lesions.  HRI measures 1.4.    Gallbladder: Small echogenic foci, likely cholesterol crystals.  No shadowing stones.  No wall thickening or pericholecystic fluid.  Negative sonographic Gray's sign.    Biliary system: Borderline dilated measuring 7 mm at maximum diameter.  No intrahepatic ductal dilatation.    Inferior vena cava: Normal in appearance.    Right kidney: 10.7 cm. No stones, solid mass, or hydronephrosis.    Left kidney: 11.3 cm.  No stones, solid mass, or hydronephrosis.    Spleen:  Enlarged measuring 13 x 5 cm.  Homogeneous architecture.    Miscellaneous: No ascites.  Impression: Heterogenous hepatic echotexture in patient with history of autoimmune hepatitis.    Small gallbladder cholesterol crystals.  No shadowing stones.    Borderline dilatation of the common bile duct.    Splenomegaly.    Electronically signed by resident: Darlene Mathew  Date:    09/06/2022  Time:    13:01    Electronically signed by: Gene Hodge MD  Date:    09/06/2022  Time:    13:23    FIBROSCAN 9/6/2022:    Findings  Median liver stiffness score:  5.3  CAP Reading: dB/m:  234     IQR/med %:  8  Interpretation  Fibrosis interpretation is based on medial liver stiffness - Kilopascal (kPa).     Fibrosis Stage:  F 0-1  Steatosis interpretation is based on controlled attenuation parameter - (dB/m).     Steatosis Grade:  S1    ASSESSMENT & PLAN:  33 y.o. White female with:      1.  Autoimmune hepatitis  IR Biopsy Liver    Ambulatory referral/consult  to [facility]    Comprehensive Metabolic Panel    CBC Auto Differential    Protime-INR    PRO-Predict Metabolites (thiopurine therapy)      2. Elevated liver enzymes  IR Biopsy Liver    Ambulatory referral/consult  to [facility]    Comprehensive Metabolic Panel    CBC Auto Differential    Protime-INR    PRO-Predict Metabolites (thiopurine therapy)      3. Long-term use of immunosuppressant medication  IR Biopsy Liver    Ambulatory referral/consult  to [facility]    Comprehensive Metabolic Panel    CBC Auto Differential    Protime-INR    PRO-Predict Metabolites (thiopurine therapy)        - Recommend liver biopsy for further evaluation of chronically elevated liver enzymes, and for definitive fibrosis staging, given thrombocytopenia.  - Continue Azathioprine 100 mg PO daily without missing any doses.  - Continue Budesonide 9 mg daily without missing any doses.   - Repeat labs today to monitor blood counts, liver and kidney function tests.  - Continue to remain abstinent from alcohol and illicit drugs.   - Continue annual dermatology visits for skin cancer screenings.  - Return to clinic 1-2 years post biopsy to review pathology results and plan of care.    Thank you for allowing me to participate in the care of Eliseo Ybarra       Hepatology Nurse Practitioner  [facility]    CC'ed note to:   No ref. provider found  Stevie Seaman MD

## 2023-11-17 ENCOUNTER — TELEPHONE (OUTPATIENT)
Dept: INTERVENTIONAL RADIOLOGY/VASCULAR | Facility: CLINIC | Age: 33
End: 2023-11-17
Payer: COMMERCIAL

## 2023-11-17 NOTE — TELEPHONE ENCOUNTER
Spoke to pt on phone, Pt is scheduled on 12/12/2023 with arrival time of 9am for IR procedure at  location.  Preop instructions given (NPO after midnight, MUST have a ride home, Nurse will call 1-2  days before to go over instructions and medications), pt verbally understood. Pt aware and confirmed, Thanks

## 2023-11-21 ENCOUNTER — PATIENT MESSAGE (OUTPATIENT)
Dept: HEPATOLOGY | Facility: CLINIC | Age: 33
End: 2023-11-21
Payer: COMMERCIAL

## 2023-11-21 LAB
6-TGN ENTSUB RBC: 85 PMOL/8X10(8)RBC (ref 235–450)
6MMP ENTSUB RBC: <425 PMOL/8X10(8)RBC

## 2023-11-28 ENCOUNTER — TELEPHONE (OUTPATIENT)
Dept: HEPATOLOGY | Facility: CLINIC | Age: 33
End: 2023-11-28
Payer: COMMERCIAL

## 2023-11-28 NOTE — TELEPHONE ENCOUNTER
Called patient to reminder her 1/2 follow up appointment and a 12/12 biopsy appointment. Mailed appointment reminder to patient.

## 2023-12-11 ENCOUNTER — TELEPHONE (OUTPATIENT)
Dept: INTERVENTIONAL RADIOLOGY/VASCULAR | Facility: HOSPITAL | Age: 33
End: 2023-12-11
Payer: COMMERCIAL

## 2023-12-12 ENCOUNTER — HOSPITAL ENCOUNTER (OUTPATIENT)
Dept: INTERVENTIONAL RADIOLOGY/VASCULAR | Facility: HOSPITAL | Age: 33
Discharge: HOME | End: 2023-12-12
Attending: NURSE PRACTITIONER
Payer: COMMERCIAL

## 2023-12-12 DIAGNOSIS — R74.8 ELEVATED LIVER ENZYMES: ICD-10-CM

## 2023-12-12 DIAGNOSIS — K75.4 AUTOIMMUNE HEPATITIS: ICD-10-CM

## 2023-12-12 DIAGNOSIS — Z79.60 LONG-TERM USE OF IMMUNOSUPPRESSANT MEDICATION: ICD-10-CM

## 2023-12-12 LAB
B-HCG UR QL: NEGATIVE
CTP QC/QA: YES

## 2023-12-12 PROCEDURE — 76942 ECHO GUIDE FOR BIOPSY: CPT | Mod: TC | Performed by: RADIOLOGY

## 2023-12-12 PROCEDURE — 88313 PR  SPECIAL STAINS,GROUP II: ICD-10-PCS | Mod: 26,,, | Performed by: PATHOLOGY

## 2023-12-12 PROCEDURE — 81025 URINE PREGNANCY TEST: CPT | Performed by: FAMILY MEDICINE

## 2023-12-12 PROCEDURE — 47000 NEEDLE BIOPSY OF LIVER PERQ: CPT | Performed by: RADIOLOGY

## 2023-12-12 PROCEDURE — 63600175 PHARM REV CODE 636 W HCPCS: Performed by: STUDENT IN AN ORGANIZED HEALTH CARE EDUCATION/TRAINING PROGRAM

## 2023-12-12 PROCEDURE — 76942 ECHO GUIDE FOR BIOPSY: CPT | Mod: 26,,, | Performed by: RADIOLOGY

## 2023-12-12 PROCEDURE — 47000 IR BIOPSY LIVER: ICD-10-PCS | Mod: ,,, | Performed by: RADIOLOGY

## 2023-12-12 PROCEDURE — 99152 MOD SED SAME PHYS/QHP 5/>YRS: CPT | Performed by: RADIOLOGY

## 2023-12-12 PROCEDURE — 88307 TISSUE EXAM BY PATHOLOGIST: CPT | Mod: 26,,, | Performed by: PATHOLOGY

## 2023-12-12 PROCEDURE — 76942 IR BIOPSY LIVER: ICD-10-PCS | Mod: 26,,, | Performed by: RADIOLOGY

## 2023-12-12 PROCEDURE — 99152 PR MOD CONSCIOUS SEDATION, SAME PHYS, 5+ YRS, FIRST 15 MIN: ICD-10-PCS | Mod: ,,, | Performed by: RADIOLOGY

## 2023-12-12 PROCEDURE — 88313 SPECIAL STAINS GROUP 2: CPT | Performed by: PATHOLOGY

## 2023-12-12 PROCEDURE — 88313 SPECIAL STAINS GROUP 2: CPT | Mod: 26,,, | Performed by: PATHOLOGY

## 2023-12-12 PROCEDURE — 99152 MOD SED SAME PHYS/QHP 5/>YRS: CPT | Mod: ,,, | Performed by: RADIOLOGY

## 2023-12-12 PROCEDURE — 88307 TISSUE EXAM BY PATHOLOGIST: CPT | Performed by: PATHOLOGY

## 2023-12-12 PROCEDURE — 88307 PR  SURG PATH,LEVEL V: ICD-10-PCS | Mod: 26,,, | Performed by: PATHOLOGY

## 2023-12-12 PROCEDURE — 27201068 IR BIOPSY LIVER

## 2023-12-12 RX ORDER — SODIUM CHLORIDE 9 MG/ML
INJECTION, SOLUTION INTRAVENOUS CONTINUOUS
Status: DISCONTINUED | OUTPATIENT
Start: 2023-12-12 | End: 2023-12-13 | Stop reason: HOSPADM

## 2023-12-12 RX ORDER — LIDOCAINE HYDROCHLORIDE 10 MG/ML
1 INJECTION, SOLUTION EPIDURAL; INFILTRATION; INTRACAUDAL; PERINEURAL ONCE
Status: DISCONTINUED | OUTPATIENT
Start: 2023-12-12 | End: 2023-12-13 | Stop reason: HOSPADM

## 2023-12-12 RX ORDER — MIDAZOLAM HYDROCHLORIDE 1 MG/ML
INJECTION INTRAMUSCULAR; INTRAVENOUS
Status: COMPLETED | OUTPATIENT
Start: 2023-12-12 | End: 2023-12-12

## 2023-12-12 RX ORDER — FENTANYL CITRATE 50 UG/ML
INJECTION, SOLUTION INTRAMUSCULAR; INTRAVENOUS
Status: COMPLETED | OUTPATIENT
Start: 2023-12-12 | End: 2023-12-12

## 2023-12-12 RX ADMIN — MIDAZOLAM HYDROCHLORIDE 1 MG: 2 INJECTION, SOLUTION INTRAMUSCULAR; INTRAVENOUS at 10:12

## 2023-12-12 RX ADMIN — FENTANYL CITRATE 50 MCG: 50 INJECTION, SOLUTION INTRAMUSCULAR; INTRAVENOUS at 10:12

## 2023-12-12 NOTE — PLAN OF CARE
Pt arrived to 173 for liver biopsy. Pt oriented to unit and staff, Fall risk reviewed and comfort measures utilized with interventions. Safety strap applied, position pillows to minimize pressure points. Blankets applied. Pt prepped and draped utilizing standard sterile technique. Patient placed on continuous monitoring, as required by sedation policy. Timeouts implemented utilizing standard universal time-out per department and facility policy. RN to remain at bedside with continuous monitoring. Pt resting comfortably. Denies pain/discomfort. Will continue to monitor. See flow sheets for monitoring, medication administration, and updates. patient verbalizes understanding.

## 2023-12-12 NOTE — NURSING
Pt given discharge info with all questions addressed.  Pt provided with phone numbers to call for any concerns.

## 2023-12-12 NOTE — H&P
Radiology History & Physical      SUBJECTIVE:     Chief Complaint: Autoimmune Hepatitis    History of Present Illness:  Eliseo Ybarra is a 33 y.o. female with history of autoimmune hepatitis. She's been treated for autoimmune hepatitis since the age of 7 at an OSH and underwent multiple prior biopsies. Recent labs showed elevated LFTs. Patient presents for random percutaneous liver biopsy under image-guidance.       Past Medical History:   Diagnosis Date    Anxiety     Auto immune neutropenia     Autoimmune hepatitis     Depression      Past Surgical History:   Procedure Laterality Date    LIVER BIOPSY         Home Meds:   Prior to Admission medications    Medication Sig Start Date End Date Taking? Authorizing Provider   azaTHIOprine (IMURAN) 50 mg Tab Take 2 tablets (100 mg total) by mouth once daily. 6/21/23 6/20/24 Yes Kelly Claire NP   budesonide (ENTOCORT EC) 3 mg capsule Take 3 capsules (9 mg total) by mouth once daily. 6/21/23  Yes Kelly Claire NP   buPROPion (WELLBUTRIN XL) 300 MG 24 hr tablet Take 300 mg by mouth once daily.   Yes Provider, Historical   FLUoxetine 40 MG capsule Take 40 mg by mouth. 5/16/23  Yes Provider, Historical   BINAXNOW COVID-19 AG SELF TEST Kit TEST AS DIRECTED TODAY 1/18/23   Provider, Historical   copper intrauterine device (PARAGARD) 380 square mm IUD 1 Intra Uterine Device by Intrauterine route once.    Provider, Historical   FLUoxetine 20 MG capsule Take 20 mg by mouth once daily. Currently taking 10 mg for 1 week the 20 mg daily    Provider, Historical     Anticoagulants/Antiplatelets: no anticoagulation    Allergies: Review of patient's allergies indicates:  No Known Allergies  Sedation History:  no adverse reactions    Review of Systems:   Hematological: no known coagulopathies  Respiratory: no shortness of breath  Cardiovascular: no chest pain  Gastrointestinal: no abdominal pain  Genito-Urinary: no dysuria  Musculoskeletal:  negative  Neurological: no TIA or stroke symptoms         OBJECTIVE:     Vital Signs (Most Recent)       Physical Exam:  ASA: 2  Mallampati: 2    General: no acute distress  Mental Status: alert and oriented to person, place and time  HEENT: normocephalic, atraumatic  Chest: unlabored breathing  Heart: regular heart rate  Abdomen: nondistended. No tenderness to palpation.   Extremity: moves all extremities. No LE edema.     Laboratory  Lab Results   Component Value Date    INR 1.1 11/16/2023       Lab Results   Component Value Date    WBC 4.65 11/16/2023    HGB 11.2 (L) 11/16/2023    HCT 33.8 (L) 11/16/2023    MCV 93 11/16/2023    PLT 79 (L) 11/16/2023      Lab Results   Component Value Date     11/16/2023     11/16/2023    K 4.3 11/16/2023     11/16/2023    CO2 24 11/16/2023    BUN 11 11/16/2023    CREATININE 1.0 11/16/2023    CALCIUM 9.4 11/16/2023    MG 1.9 05/26/2015    ALT 34 11/16/2023    AST 40 11/16/2023    ALBUMIN 4.0 11/16/2023    BILITOT 0.2 11/16/2023       ASSESSMENT/PLAN:     Sedation Plan: Up to Moderate.   Patient will undergo percutaneous liver biopsy under image-guidance.    Darlene Mathew MD  Radiology, PGY III  [facility]

## 2023-12-12 NOTE — CARE UPDATE
patient arrived to U [facility] via stretcher in [facility], report received from OLIVIA Torres, see chart for vital signs and assessment, will continue to monitor patient until properly relieved.

## 2023-12-12 NOTE — DISCHARGE INSTRUCTIONS
For scheduling: Call Teagan at [phone]    For questions or concerns call: MEI MON-FRI 8 AM- 5PM [phone]. Radiology resident on call [phone].    For immediate concerns that are not emergent, you may call our radiology clinic at: [phone]

## 2023-12-12 NOTE — PLAN OF CARE
Pt tolerated liver biopsy well, specimen collected and sent to lab, dressing c/d/I, 1 hr recovery, transfer to mpu, report given at bedside

## 2023-12-12 NOTE — PROCEDURES
Radiology Post-Procedure Note    Pre Op Diagnosis: autoimmine hepatitis    Post Op Diagnosis: Same    Procedure: Ultrasound guided liver biopsy    Procedure performed by: Monalisa Christensen MD / Carly Coulter MD    Written Informed Consent Obtained: Yes    Specimen Removed: Yes    Estimated Blood Loss: Minimal    Findings: Moderate sedation and local anesthesia were used.    A 17/18-gauge Bard Lisman biopsy device was used to remove 3 random right hepatic lobe specimen.  This specimen was sent to pathology for further evaluation.      The patient tolerated the procedure well and there were no complications.  Please see Imaging report for further details.    Monalisa Christensen MD MSCR  PGY-5 Radiology Resident

## 2023-12-13 VITALS
RESPIRATION RATE: 17 BRPM | DIASTOLIC BLOOD PRESSURE: 60 MMHG | OXYGEN SATURATION: 100 % | SYSTOLIC BLOOD PRESSURE: 100 MMHG | TEMPERATURE: 98 F | WEIGHT: 140 LBS | BODY MASS INDEX: 21.97 KG/M2 | HEART RATE: 70 BPM | HEIGHT: 67 IN

## 2023-12-13 DIAGNOSIS — K75.4 AUTOIMMUNE HEPATITIS: ICD-10-CM

## 2023-12-13 DIAGNOSIS — Z79.60 LONG-TERM USE OF IMMUNOSUPPRESSANT MEDICATION: ICD-10-CM

## 2023-12-13 RX ORDER — BUDESONIDE 3 MG/1
9 CAPSULE, COATED PELLETS ORAL DAILY
Qty: 270 CAPSULE | Refills: 3 | Status: SHIPPED | OUTPATIENT
Start: 2023-12-13

## 2023-12-18 LAB
FINAL PATHOLOGIC DIAGNOSIS: NORMAL
GROSS: NORMAL
Lab: NORMAL
MICROSCOPIC EXAM: NORMAL

## 2023-12-27 ENCOUNTER — PATIENT MESSAGE (OUTPATIENT)
Dept: HEPATOLOGY | Facility: CLINIC | Age: 33
End: 2023-12-27
Payer: COMMERCIAL

## 2024-01-02 ENCOUNTER — OFFICE VISIT (OUTPATIENT)
Dept: HEPATOLOGY | Facility: CLINIC | Age: 34
End: 2024-01-02
Payer: COMMERCIAL

## 2024-01-02 DIAGNOSIS — K75.4 AUTOIMMUNE HEPATITIS: Primary | ICD-10-CM

## 2024-01-02 DIAGNOSIS — Z79.899 IMMUNOSUPPRESSION DUE TO DRUG THERAPY: ICD-10-CM

## 2024-01-02 DIAGNOSIS — K74.01 EARLY HEPATIC FIBROSIS: ICD-10-CM

## 2024-01-02 DIAGNOSIS — D69.6 THROMBOCYTOPENIA: ICD-10-CM

## 2024-01-02 DIAGNOSIS — D84.821 IMMUNOSUPPRESSION DUE TO DRUG THERAPY: ICD-10-CM

## 2024-01-02 PROCEDURE — 1159F MED LIST DOCD IN RCRD: CPT | Mod: CPTII,95,, | Performed by: NURSE PRACTITIONER

## 2024-01-02 PROCEDURE — 99214 OFFICE O/P EST MOD 30 MIN: CPT | Mod: 95,,, | Performed by: NURSE PRACTITIONER

## 2024-01-02 PROCEDURE — 1160F RVW MEDS BY RX/DR IN RCRD: CPT | Mod: CPTII,95,, | Performed by: NURSE PRACTITIONER

## 2024-01-02 NOTE — PROGRESS NOTES
The patient location is: [place]  The chief complaint leading to consultation is: Autoimmune Hepatitis    Visit type: audiovisual    Face to Face time with patient: 12  30 minutes of total time spent on the encounter, which includes face to face time and non-face to face time preparing to see the patient (eg, review of tests), Obtaining and/or reviewing separately obtained history, Documenting clinical information in the electronic or other health record, Independently interpreting results (not separately reported) and communicating results to the patient/family/caregiver, or Care coordination (not separately reported).     Each patient to whom he or she provides medical services by telemedicine is:  (1) informed of the relationship between the physician and patient and the respective role of any other health care provider with respect to management of the patient; and (2) notified that he or she may decline to receive medical services by telemedicine and may withdraw from such care at any time.    Notes:     [facility] VISIT ESTABLISHED PT NOTE    REFERRING PROVIDER:  No ref. provider found    CHIEF COMPLAINT: Autoimmune Hepatitis    HPI: This is a 33 y.o. White female with PMH noted below, presenting for follow up for autoimmune hepatitis. She was previously followed by a Hepatologist in [place], and was last seen in clinic by myself in 11/2023. She was diagnosed with autoimmune hepatitis at age 7, previously treated with Azathioprine and steroids. She is now on Budesonide 9 mg daily (increased after last visit in 12/2022), in addition to Azathioprine (100 mg daily - dose increased after visit in September 2022).   Family history is significant for Father with a history of alcohol abuse. She also has a history of alcohol abuse, and illicit drug use; sober since 2018. She denies current illicit drug use. HCV negative on prior labs in 2015. She is immune to Hepatitis A and B, through prior vaccination.      Liver biopsy in 2006 (no evidence of significant fibrosis, per patient report). Fibroscan to restage her liver disease in 9/2022 was suggestive of no significant hepatic steatosis with F0-F1 fibrosis and a low likelihood of cirrhosis. Abdominal US in 9/2022 showed no focal liver lesions, with mild splenomegaly (13 cm). US also showed small gallbladder cholesterol crystals, & borderline dilatation of the CBD (7 mm). Synthetic function remains intact, but transaminases and ALP have historically been significantly elevated, as below:     Latest Reference Range & Units 08/25/22 15:17 10/20/22 08:54 11/18/22 08:52 06/29/23 08:34 09/15/23 12:36   ALP 55 - 135 U/L 266 (H) 273 (H) 200 (H) 195 (H) 195 (H)   PROTEIN TOTAL 6.0 - 8.4 g/dL 7.7 7.7 7.1 7.0 7.3   Albumin 3.5 - 5.2 g/dL 4.2 4.2 4.0 4.0 4.1   BILIRUBIN TOTAL 0.1 - 1.0 mg/dL 0.4 0.5 0.4 0.5 0.4   AST 10 - 40 U/L 99 (H) 81 (H) 43 (H) 61 (H) 114 (H)   ALT 10 - 44 U/L 110 (H) 65 (H) 37 64 (H) 138 (H)     Additionally, platelet counts are low, and have been trending down over the year, which raises the concern for progression in fibrosis, as Thiopurine metabolite levels have been low/normal (last checked in 6/2023).      FIB-4 Calculation: 3.61 at 11/16/2023  4:35 PM   Calculated from:  Last SGOT/AST : 40 at 1/2/2024 11:05 AM  Last SGPT/ALT: 138 at 11/16/2023  4:35 PM   Platelets: 79 at 1/2/2024 11:05 AM   Age: 33 y.o.     FIB-4 below 1.30 is considered as low-risk for advanced fibrosis  FIB-4 over 2.67 is considered as high-risk for advanced fibrosis  FIB-4 values between 1.30 and 2.67 are considered as intermediate-risk of advanced fibrosis for ages 36-64.     For ages > 64 the cut-off for low-risk goes to < 2.  This is a screening tool and clinical judgement should be used in the interpretation of these results.    She is still taking Azathioprine 100 mg daily, and resumed Budesonide 9 mg daily after last visit in June. She admits to occasional missed doses, but  has been compliant with treatment otherwise. Repeat labs in 11/2023 showed improvement:     Latest Reference Range & Units 11/16/23 16:21   ALP 55 - 135 U/L 116   PROTEIN TOTAL 6.0 - 8.4 g/dL 7.1   Albumin 3.5 - 5.2 g/dL 4.0   BILIRUBIN TOTAL 0.1 - 1.0 mg/dL 0.2   AST 10 - 40 U/L 40   ALT 10 - 44 U/L 34     Due to chronic thrombocytopenia, she underwent liver biopsy in 12/2023 for restaging purposes. Pathology results were consistent with treated/partially treated AIH with stage 2 (moderate) fibrosis, as below:    Final Pathologic Diagnosis Liver, random (needle biopsy):  Mild portal lymphoplasmacytic inflammation  Minimal interface or lobular activity.  See comment  Mild ductular proliferation with willie biliary neutrophils.  See comment  Scattered portal and lobular macrophages, may represent response to prior injury  Periportal fibrosis with incomplete bridging (stage 2 of 4)  No significant steatosis  No hepatocyte iron    Comment:  Specimen is adequate for evaluation.  Sections show patchy mild portal lymphoplasmacytic inflammation with minimal interface and lobular activity. There are scattered portal and lobular macrophages which may represent response to prior injury.  The findings are non-specific, but given the patient's clinical history of autoimmune hepatitis (AIH), the findings are compatible with treated/partially treated autoimmune hepatitis. There is a mild ductulare reaction with peribiliary neutrophils.    This may represent reaction to prior interface activity, duct issue/obstruction, infection, drug effect.    The trichrome highlights variable fibrosis involvement: Periportal fibrosis, incomplete bridging and focal/rare nodule formation.  Overall, the findings are most compatible with stage 2 of 4 fibrosis.       She is well appearing, and has no signs or symptoms of hepatic decompensation including jaundice, dark urine, abdominal distention, lower extremity, hematemesis, melena, or periods of  confusion suggestive of hepatic encephalopathy. No abnormal skin rashes, or generalized joint pain.     Review of patient's allergies indicates:  No Known Allergies    Current Outpatient Medications on File Prior to Visit   Medication Sig Dispense Refill    azaTHIOprine (IMURAN) 50 mg Tab Take 2 tablets (100 mg total) by mouth once daily. 180 tablet 3    BINAXNOW COVID-19 AG SELF TEST Kit TEST AS DIRECTED TODAY      budesonide (ENTOCORT EC) 3 mg capsule Take 3 capsules (9 mg total) by mouth once daily. 270 capsule 3    buPROPion (WELLBUTRIN XL) 300 MG 24 hr tablet Take 300 mg by mouth once daily.      copper intrauterine device (PARAGARD) 380 square mm IUD 1 Intra Uterine Device by Intrauterine route once.      FLUoxetine 20 MG capsule Take 20 mg by mouth once daily. Currently taking 10 mg for 1 week the 20 mg daily      FLUoxetine 40 MG capsule Take 40 mg by mouth.       No current facility-administered medications on file prior to visit.     PMHX:  has a past medical history of Anxiety, Auto immune neutropenia, Autoimmune hepatitis, and Depression.    PSHX:  has a past surgical history that includes Liver biopsy.    FAMILY HISTORY: Negative for liver disease, reviewed in [facility]    SOCIAL HISTORY:   Social History     Tobacco Use   Smoking Status Former    Types: Cigarettes   Smokeless Tobacco Former     Social History     Substance and Sexual Activity   Alcohol Use Not Currently    Comment: History of alcohol abuse, sober since 2018.     Social History     Substance and Sexual Activity   Drug Use Not Currently    Types: Marijuana     ROS:   GENERAL: Denies fever, chills, weight loss/gain, fatigue  HEENT: Denies headaches, dizziness, vision/hearing changes  CARDIOVASCULAR: Denies chest pain, palpitations, or edema  RESPIRATORY: Denies dyspnea, cough  GI: Denies abdominal pain, rectal bleeding, nausea, vomiting. No change in bowel pattern or color  : Denies dysuria, hematuria   SKIN: Denies rash, itching   NEURO:  Denies confusion, memory loss, or mood changes  PSYCH: Denies depression or anxiety  HEME/LYMPH: Denies easy bruising or bleeding    PHYSICAL EXAM:   Friendly White female, in no acute distress; alert and oriented to person, place and time.  VITALS: LMP 11/29/2023 (Approximate)    HENT: Normocephalic, without obvious abnormality.   EYES: Sclerae anicteric.   NECK: No obvious masses.  CARDIOVASCULAR: No peripheral edema.  RESPIRATORY: Normal respiratory effort.  GI: Non-distended abdomen.  EXTREMITIES: No peripheral edema.   SKIN:  No jaundice.   NEURO:  No asterixis.  PSYCH:  Memory intact. Thought and speech pattern appropriate. Behavior normal. No depression or anxiety noted.    DIAGNOSTIC STUDIES:    FIBROSCAN 9/6/2022:    Findings  Median liver stiffness score:  5.3  CAP Reading: dB/m:  234     IQR/med %:  8  Interpretation  Fibrosis interpretation is based on medial liver stiffness - Kilopascal (kPa).     Fibrosis Stage:  F 0-1  Steatosis interpretation is based on controlled attenuation parameter - (dB/m).     Steatosis Grade:  S1    US ABDOMEN COMPLETE 9/6/2022:    FINDINGS:    Pancreas: The visualized portions of pancreas appear normal.     Aorta: No aneurysm.     Liver:  Normal in size, measuring 14.9 cm.  Heterogenous/coarse parenchymal echotexture. No focal lesions.  HRI measures 1.4.     Gallbladder: Small echogenic foci, likely cholesterol crystals.  No shadowing stones.  No wall thickening or pericholecystic fluid.  Negative sonographic Gray's sign.     Biliary system: Borderline dilated measuring 7 mm at maximum diameter.  No intrahepatic ductal dilatation.     Inferior vena cava: Normal in appearance.     Right kidney: 10.7 cm. No stones, solid mass, or hydronephrosis.     Left kidney: 11.3 cm.  No stones, solid mass, or hydronephrosis.     Spleen:  Enlarged measuring 13 x 5 cm.  Homogeneous architecture.     Miscellaneous: No ascites.     Impression:     Heterogenous hepatic echotexture in patient  with history of autoimmune hepatitis.     Small gallbladder cholesterol crystals.  No shadowing stones.     Borderline dilatation of the common bile duct.     Splenomegaly.    LIVER PATHOLOGY 12/12/2023:    Final Pathologic Diagnosis Liver, random (needle biopsy):  Mild portal lymphoplasmacytic inflammation  Minimal interface or lobular activity.  See comment  Mild ductular proliferation with willie biliary neutrophils.  See comment  Scattered portal and lobular macrophages, may represent response to prior injury  Periportal fibrosis with incomplete bridging (stage 2 of 4)  No significant steatosis  No hepatocyte iron    Comment:  Specimen is adequate for evaluation.  Sections show patchy mild portal lymphoplasmacytic inflammation with minimal interface and lobular activity. There are scattered portal and lobular macrophages which may represent response to prior injury.   The findings are non-specific, but given the patient's clinical history of autoimmune hepatitis (AIH), the findings are compatible with treated/partially treated autoimmune hepatitis. There is a mild ductulare reaction with peribiliary neutrophils.    This may represent reaction to prior interface activity, duct issue/obstruction, infection, drug effect.    The trichrome highlights variable fibrosis involvement: Periportal fibrosis, incomplete bridging and focal/rare nodule formation.  Overall, the findings are most compatible with stage 2 of 4 fibrosis.       ASSESSMENT & PLAN:  33 y.o. White female with:      1. Autoimmune hepatitis  Comprehensive Metabolic Panel    CBC Auto Differential    Ambulatory referral/consult to Hematology / Oncology      2. Early hepatic fibrosis  Comprehensive Metabolic Panel    CBC Auto Differential      3. Immunosuppression due to drug therapy  Comprehensive Metabolic Panel    CBC Auto Differential    Ambulatory referral/consult to Hematology / Oncology      4. Thrombocytopenia  Comprehensive Metabolic Panel    CBC  Auto Differential    Ambulatory referral/consult to Hematology / Oncology        - Continue Azathioprine 100 mg PO daily without missing any doses.  - Continue Budesonide 9 mg daily without missing any doses.   - Repeat labs now to monitor blood counts, liver and kidney function tests.  - Recommend Hematology consult for further evaluation of thrombocytopenia.  - Continue to remain abstinent from alcohol and illicit drugs.   - Continue annual dermatology visits for skin cancer screenings.  - Return to clinic to be determined by lab results, likely 6 months.    Thank you for allowing me to participate in the care of Eliseo Ybarra       Hepatology Nurse Practitioner  [facility]    CC'ed note to:   No ref. provider found  Stevie Seaman MD

## 2024-03-05 ENCOUNTER — OFFICE VISIT (OUTPATIENT)
Dept: HEMATOLOGY/ONCOLOGY | Facility: CLINIC | Age: 34
End: 2024-03-05
Payer: COMMERCIAL

## 2024-03-05 DIAGNOSIS — K75.4 AUTOIMMUNE HEPATITIS: ICD-10-CM

## 2024-03-05 DIAGNOSIS — Z79.60 LONG-TERM USE OF IMMUNOSUPPRESSANT MEDICATION: ICD-10-CM

## 2024-03-05 DIAGNOSIS — D69.6 THROMBOCYTOPENIA: Primary | ICD-10-CM

## 2024-03-05 PROCEDURE — 99204 OFFICE O/P NEW MOD 45 MIN: CPT | Mod: 95,,, | Performed by: INTERNAL MEDICINE

## 2024-03-05 NOTE — PROGRESS NOTES
Hematology and Medical Oncology   New Patient Consult     03/05/2024  Referred by:  Kelly Claire    Reason For Referral: Thrombocytopenia    Telemedicine Documentation:   The patient location is: her car  The chief complaint leading to consultation is: low platelets    Visit type: audiovisual    Face to Face time with patient: 25  35 minutes of total time spent on the encounter, which includes face to face time and non-face to face time preparing to see the patient (eg, review of tests), Obtaining and/or reviewing separately obtained history, Documenting clinical information in the electronic or other health record, Independently interpreting results (not separately reported) and communicating results to the patient/family/caregiver, or Care coordination (not separately reported).         Each patient to whom he or she provides medical services by telemedicine is:  (1) informed of the relationship between the physician and patient and the respective role of any other health care provider with respect to management of the patient; and (2) notified that he or she may decline to receive medical services by telemedicine and may withdraw from such care at any time.        History of Present Ilness:   Eliseo Tate) is a pleasant 33 y.o.female who presents virtually to discuss her platelet count. Recently diagnosed with auto immune hepatitis and following with Hepatology.     Reviewed labs dating back to 2015 where platelets have consistently been in the 150-80 range. Denies signs of bruising or bleeding.       PAST MEDICAL HISTORY:   Past Medical History:   Diagnosis Date    Anxiety     Auto immune neutropenia     Autoimmune hepatitis     Depression        PAST SURGICAL HISTORY:   Past Surgical History:   Procedure Laterality Date    LIVER BIOPSY         PAST SOCIAL HISTORY:   reports that she has quit smoking. Her smoking use included cigarettes. She has quit using smokeless tobacco. She  reports that she does not currently use alcohol. She reports that she does not currently use drugs after having used the following drugs: Marijuana.    FAMILY HISTORY:  Family History   Problem Relation Age of Onset    Depression Mother     Hyperlipidemia Mother     Melanoma Father     Alcohol abuse Father     Cancer Father     Depression Father     Depression Sister     Depression Brother     Depression Brother     Cancer Maternal Aunt     Depression Maternal Grandfather     ALS Maternal Grandfather     Vitiligo Maternal Grandfather     Depression Paternal Grandmother     Cancer Paternal Grandfather     Depression Paternal Grandfather     Breast cancer Neg Hx     Colon cancer Neg Hx     Ovarian cancer Neg Hx        CURRENT MEDICATIONS:   Current Outpatient Medications   Medication Sig    azaTHIOprine (IMURAN) 50 mg Tab Take 2 tablets (100 mg total) by mouth once daily.    BINAXNOW COVID-19 AG SELF TEST Kit TEST AS DIRECTED TODAY    budesonide (ENTOCORT EC) 3 mg capsule Take 3 capsules (9 mg total) by mouth once daily.    buPROPion (WELLBUTRIN XL) 300 MG 24 hr tablet Take 300 mg by mouth once daily.    copper intrauterine device (PARAGARD) 380 square mm IUD 1 Intra Uterine Device by Intrauterine route once.    FLUoxetine 20 MG capsule Take 20 mg by mouth once daily. Currently taking 10 mg for 1 week the 20 mg daily    FLUoxetine 40 MG capsule Take 40 mg by mouth.     No current facility-administered medications for this visit.     ALLERGIES:   Review of patient's allergies indicates:  No Known Allergies      Review of Systems:     Review of Systems   Constitutional:  Negative for appetite change, chills, diaphoresis, fatigue, fever and unexpected weight change.   HENT:   Negative for hearing loss, mouth sores, nosebleeds, sore throat, trouble swallowing and voice change.    Eyes:  Negative for eye problems and icterus.   Respiratory:  Negative for chest tightness, cough, hemoptysis, shortness of breath and wheezing.     Cardiovascular:  Negative for chest pain, leg swelling and palpitations.   Gastrointestinal:  Negative for abdominal distention, abdominal pain, blood in stool, diarrhea, nausea and vomiting.   Endocrine: Negative for hot flashes.   Genitourinary:  Negative for bladder incontinence, difficulty urinating, dysuria, frequency and hematuria.    Musculoskeletal:  Negative for arthralgias, back pain, flank pain, gait problem, myalgias, neck pain and neck stiffness.   Skin:  Negative for itching, rash and wound.   Neurological:  Negative for dizziness, extremity weakness, gait problem, headaches, numbness, seizures and speech difficulty.   Hematological:  Negative for adenopathy. Does not bruise/bleed easily.   Psychiatric/Behavioral:  Negative for confusion, depression and sleep disturbance. The patient is not nervous/anxious.           Physical Exam:   Limited secondary to virtual visit      ECOG Performance Status: (foot note - ECOG PS provided by Eastern Cooperative Oncology Group) 0 - Asymptomatic    Karnofsky Performance Score:  100%- Normal, No Complaints, No Evidence of Disease    Labs:   Lab Results   Component Value Date    WBC 4.65 11/16/2023    HGB 11.2 (L) 11/16/2023    HCT 33.8 (L) 11/16/2023    PLT 79 (L) 11/16/2023    ALT 34 11/16/2023    AST 40 11/16/2023     11/16/2023    K 4.3 11/16/2023     11/16/2023    CREATININE 1.0 11/16/2023    BUN 11 11/16/2023    CO2 24 11/16/2023    TSH 1.188 05/25/2015    INR 1.1 11/16/2023       Imaging: Previous imaging has been reviewed     Assessment and Plan:     Ms. Ybarra is a pleasant 33 year old female with Auto Immune Hepatitis and mild/moderate thrombocytopenia.    Thrombocytopenia  --First seen on labs in 2015 and very stable since 2022  --Likely related to hepatic disease and splenic enlargement seen on ultrasound  --Will look at peripheral smear and blue top tube to assess platelet for fragmentation or clumping  --Follow up to be determined by  additional lab work    Auto Immune Hepatitis  --Follows with hepatology  --liver biopsy and imaging completed    25 minutes were spent face to face with the patient to discuss the disease, natural history, and possible treatment options. I have provided the patient with an opportunity to ask questions and have all questions answered to her satisfaction.       she will return to clinic based on further labs, but knows to call in the interim if symptoms change or should a problem arise.        Erika Jhaveri MD  Hematology and Medical Oncology  [facility] Trans[facility]  [facility]      BMT Chart Routing      Follow up with physician . 1. labs: cbc, path interp, haptoglbin, retic, cmp, blue top tub   Follow up with MICHAEL    Provider visit type    Infusion scheduling note    Injection scheduling note    Labs    Imaging    Pharmacy appointment    Other referrals

## 2024-03-28 ENCOUNTER — LAB VISIT (OUTPATIENT)
Dept: LAB | Facility: HOSPITAL | Age: 34
End: 2024-03-28
Attending: INTERNAL MEDICINE
Payer: COMMERCIAL

## 2024-03-28 DIAGNOSIS — D69.6 THROMBOCYTOPENIA: ICD-10-CM

## 2024-03-28 LAB
ALBUMIN SERPL BCP-MCNC: 3.8 G/DL (ref 3.5–5.2)
ALP SERPL-CCNC: 132 U/L (ref 55–135)
ALT SERPL W/O P-5'-P-CCNC: 129 U/L (ref 10–44)
ANION GAP SERPL CALC-SCNC: 4 MMOL/L (ref 8–16)
AST SERPL-CCNC: 123 U/L (ref 10–40)
BASOPHILS # BLD AUTO: 0.03 K/UL (ref 0–0.2)
BASOPHILS NFR BLD: 0.9 % (ref 0–1.9)
BILIRUB SERPL-MCNC: 0.6 MG/DL (ref 0.1–1)
BUN SERPL-MCNC: 11 MG/DL (ref 6–20)
CALCIUM SERPL-MCNC: 9.1 MG/DL (ref 8.7–10.5)
CHLORIDE SERPL-SCNC: 108 MMOL/L (ref 95–110)
CO2 SERPL-SCNC: 25 MMOL/L (ref 23–29)
CREAT SERPL-MCNC: 0.8 MG/DL (ref 0.5–1.4)
DIFFERENTIAL METHOD BLD: ABNORMAL
EOSINOPHIL # BLD AUTO: 0.1 K/UL (ref 0–0.5)
EOSINOPHIL NFR BLD: 4 % (ref 0–8)
ERYTHROCYTE [DISTWIDTH] IN BLOOD BY AUTOMATED COUNT: 14.6 % (ref 11.5–14.5)
EST. GFR  (NO RACE VARIABLE): >60 ML/MIN/1.73 M^2
GLUCOSE SERPL-MCNC: 103 MG/DL (ref 70–110)
HAPTOGLOB SERPL-MCNC: 57 MG/DL (ref 30–250)
HCT VFR BLD AUTO: 32.5 % (ref 37–48.5)
HGB BLD-MCNC: 10.9 G/DL (ref 12–16)
IMM GRANULOCYTES # BLD AUTO: 0.01 K/UL (ref 0–0.04)
IMM GRANULOCYTES NFR BLD AUTO: 0.3 % (ref 0–0.5)
LYMPHOCYTES # BLD AUTO: 0.5 K/UL (ref 1–4.8)
LYMPHOCYTES NFR BLD: 15.7 % (ref 18–48)
MCH RBC QN AUTO: 32.1 PG (ref 27–31)
MCHC RBC AUTO-ENTMCNC: 33.5 G/DL (ref 32–36)
MCV RBC AUTO: 96 FL (ref 82–98)
MONOCYTES # BLD AUTO: 0.3 K/UL (ref 0.3–1)
MONOCYTES NFR BLD: 7.7 % (ref 4–15)
NEUTROPHILS # BLD AUTO: 2.3 K/UL (ref 1.8–7.7)
NEUTROPHILS NFR BLD: 71.4 % (ref 38–73)
NRBC BLD-RTO: 0 /100 WBC
PATH REV BLD -IMP: NORMAL
PLATELET # BLD AUTO: 48 K/UL (ref 150–450)
PMV BLD AUTO: 11.2 FL (ref 9.2–12.9)
POTASSIUM SERPL-SCNC: 4 MMOL/L (ref 3.5–5.1)
PROT SERPL-MCNC: 6.4 G/DL (ref 6–8.4)
RBC # BLD AUTO: 3.4 M/UL (ref 4–5.4)
RETICS/RBC NFR AUTO: 1.5 % (ref 0.5–2.5)
SODIUM SERPL-SCNC: 137 MMOL/L (ref 136–145)
WBC # BLD AUTO: 3.24 K/UL (ref 3.9–12.7)

## 2024-03-28 PROCEDURE — 36415 COLL VENOUS BLD VENIPUNCTURE: CPT | Performed by: INTERNAL MEDICINE

## 2024-03-28 PROCEDURE — 85060 BLOOD SMEAR INTERPRETATION: CPT | Mod: ,,, | Performed by: PATHOLOGY

## 2024-03-28 PROCEDURE — 85045 AUTOMATED RETICULOCYTE COUNT: CPT | Performed by: INTERNAL MEDICINE

## 2024-03-28 PROCEDURE — 83010 ASSAY OF HAPTOGLOBIN QUANT: CPT | Performed by: INTERNAL MEDICINE

## 2024-03-28 PROCEDURE — 85025 COMPLETE CBC W/AUTO DIFF WBC: CPT | Performed by: INTERNAL MEDICINE

## 2024-03-28 PROCEDURE — 80053 COMPREHEN METABOLIC PANEL: CPT | Performed by: INTERNAL MEDICINE

## 2024-04-01 LAB — PATH REV BLD -IMP: NORMAL

## 2024-04-01 NOTE — PROGRESS NOTES
Gabo Guthrie can we get Ms. Ybarra into the next available virtual benign heme clinic?    Can you also reach out to her to make sure she's following up with hepatology for the increased liver function studies.

## 2024-04-03 DIAGNOSIS — Z79.60 LONG-TERM USE OF IMMUNOSUPPRESSANT MEDICATION: ICD-10-CM

## 2024-04-03 DIAGNOSIS — K75.4 AUTOIMMUNE HEPATITIS: ICD-10-CM

## 2024-04-05 ENCOUNTER — TELEPHONE (OUTPATIENT)
Dept: HEPATOLOGY | Facility: CLINIC | Age: 34
End: 2024-04-05
Payer: COMMERCIAL

## 2024-04-05 RX ORDER — AZATHIOPRINE 50 MG/1
100 TABLET ORAL
Qty: 180 TABLET | Refills: 0 | Status: SHIPPED | OUTPATIENT
Start: 2024-04-05 | End: 2024-05-14 | Stop reason: SDUPTHER

## 2024-04-05 NOTE — TELEPHONE ENCOUNTER
Arsenio's pt     Pt due for a f/u with Arsenio in the spring with labs a few days before, please contact pt to schedule    I refilled her Imuran until she can see arsenio for f/u    Thanks !

## 2024-04-10 ENCOUNTER — TELEPHONE (OUTPATIENT)
Dept: HEMATOLOGY/ONCOLOGY | Facility: CLINIC | Age: 34
End: 2024-04-10
Payer: COMMERCIAL

## 2024-04-10 DIAGNOSIS — K75.4 AUTOIMMUNE HEPATITIS: ICD-10-CM

## 2024-04-10 DIAGNOSIS — D69.6 THROMBOCYTOPENIA: Primary | ICD-10-CM

## 2024-04-10 NOTE — TELEPHONE ENCOUNTER
----- Message from Shannon Toussaint sent at 3/18/2024  9:34 PM CDT -----  Regarding: lab orders needed iron, ferritin, immunoglobulins  lab orders needed iron, ferritin, immunoglobulins

## 2024-04-21 ENCOUNTER — ON-DEMAND VIRTUAL (OUTPATIENT)
Dept: URGENT CARE | Facility: CLINIC | Age: 34
End: 2024-04-21
Payer: COMMERCIAL

## 2024-04-21 DIAGNOSIS — H66.91 RIGHT OTITIS MEDIA, UNSPECIFIED OTITIS MEDIA TYPE: Primary | ICD-10-CM

## 2024-04-21 PROCEDURE — 99213 OFFICE O/P EST LOW 20 MIN: CPT | Mod: 95,,, | Performed by: PHYSICIAN ASSISTANT

## 2024-04-21 RX ORDER — FLUTICASONE PROPIONATE 50 MCG
1 SPRAY, SUSPENSION (ML) NASAL DAILY
Qty: 1 EACH | Refills: 0 | Status: SHIPPED | OUTPATIENT
Start: 2024-04-21

## 2024-04-21 RX ORDER — AMOXICILLIN AND CLAVULANATE POTASSIUM 875; 125 MG/1; MG/1
1 TABLET, FILM COATED ORAL EVERY 12 HOURS
Qty: 14 TABLET | Refills: 0 | Status: SHIPPED | OUTPATIENT
Start: 2024-04-21 | End: 2024-04-28

## 2024-04-22 NOTE — PATIENT INSTRUCTIONS
1. Recommend to start antibiotic as well as nasal spray, sent to pharmacy. Please take as directed.  2. Push fluids. Can use tylenol as needed for fever or pain control. Can also use warm heating pad/compresses over ear and sinus for relief.   3. Recommend no swimming until resolution of symptoms. Cotton ball in ear while showering.  4. Follow up with urgent care or primary care provider in 2-3 days if no improvement, sooner if worsening or new symptoms.  5. You must understand that you've received a Telehealth Urgent Care treatment only and that you may be released before all your medical problems are known or treated. You, the patient, will arrange for follow up care as instructed.

## 2024-04-22 NOTE — PROGRESS NOTES
Subjective:      Patient ID: Eliseo Ybarra is a 33 y.o. female.    Vitals:  vitals were not taken for this visit.     Chief Complaint: Otalgia      Visit Type: TELE AUDIOVISUAL    Present with the patient at the time of consultation: TELEMED PRESENT WITH PATIENT: None  Location - at home in [place]    Past Medical History:   Diagnosis Date    Anxiety     Auto immune neutropenia     Autoimmune hepatitis     Depression      Past Surgical History:   Procedure Laterality Date    LIVER BIOPSY       Review of patient's allergies indicates:  No Known Allergies  Current Outpatient Medications on File Prior to Visit   Medication Sig Dispense Refill    azaTHIOprine (IMURAN) 50 mg Tab TAKE 2 TABLETS(100 MG) BY MOUTH EVERY  tablet 0    BINAXNOW COVID-19 AG SELF TEST Kit TEST AS DIRECTED TODAY      budesonide (ENTOCORT EC) 3 mg capsule Take 3 capsules (9 mg total) by mouth once daily. 270 capsule 3    buPROPion (WELLBUTRIN XL) 300 MG 24 hr tablet Take 300 mg by mouth once daily.      copper intrauterine device (PARAGARD) 380 square mm IUD 1 Intra Uterine Device by Intrauterine route once.      FLUoxetine 20 MG capsule Take 20 mg by mouth once daily. Currently taking 10 mg for 1 week the 20 mg daily      FLUoxetine 40 MG capsule Take 40 mg by mouth.       No current facility-administered medications on file prior to visit.     Family History   Problem Relation Name Age of Onset    Depression Mother      Hyperlipidemia Mother      Melanoma Father      Alcohol abuse Father      Cancer Father      Depression Father      Depression Sister      Depression Brother      Depression Brother      Cancer Maternal Aunt      Depression Maternal Grandfather      ALS Maternal Grandfather      Vitiligo Maternal Grandfather      Depression Paternal Grandmother      Cancer Paternal Grandfather      Depression Paternal Grandfather      Breast cancer Neg Hx      Colon cancer Neg Hx      Ovarian cancer Neg Hx         Medications Ordered                 [id number] - [place], [place] - [address]   [address] [facility] [address]    Telephone: [phone]   Fax: [phone]   Hours: Not open 24 hours                         E-Prescribed (2 of 2)              amoxicillin-clavulanate 875-125mg (AUGMENTIN) 875-125 mg per tablet    Sig: Take 1 tablet by mouth every 12 (twelve) hours. for 7 days       Start: 24     Quantity: 14 tablet Refills: 0                         fluticasone propionate (FLONASE) 50 mcg/actuation nasal spray    Si spray (50 mcg total) by Each Nostril route once daily.       Start: 24     Quantity: 1 each Refills: 0                           Ohs Peq Odvv Intake    2024  8:35 PM CDT - Filed by Patient   What is your current physical address in the event of a medical emergency? [address] [place] [id number]   Are you able to take your vital signs? No   Please attach any relevant images or files          HPI  32yo female presents with c/o feeling sick about five days ago with sore throat, congestion and now with right ear pain, feels under water. Suppose to fly on Thursday. Denies fevers, rash, ear drainage.           Constitution: Negative for chills and fever.   HENT:  Positive for ear pain, congestion and sore throat. Negative for ear discharge and trouble swallowing.    Respiratory:  Negative for cough.    Gastrointestinal:  Negative for abdominal pain, nausea, vomiting and diarrhea.   Skin:  Negative for rash.   Neurological:  Negative for headaches.        Objective:   The physical exam was conducted virtually.  Physical Exam   Constitutional: She is oriented to person, place, and time.  Non-toxic appearance. She does not appear ill. No distress.   HENT:   Head: Normocephalic and atraumatic.   Mouth/Throat: Uvula is midline and mucous membranes are normal. No trismus in the jaw. No uvula swelling. Posterior oropharyngeal  erythema present. No oropharyngeal exudate, posterior oropharyngeal edema or tonsillar abscesses.   Neck: Neck supple.   Pulmonary/Chest: Effort normal. No respiratory distress.   Abdominal: Normal appearance.   Lymphadenopathy:     She has cervical adenopathy (right sided).   Neurological: She is alert and oriented to person, place, and time. Coordination normal.   Skin: Skin is dry, not diaphoretic, not pale and no rash.   Psychiatric: Her behavior is normal. Judgment and thought content normal.       Assessment:     1. Right otitis media, unspecified otitis media type        Plan:       Right otitis media, unspecified otitis media type    Other orders  -     amoxicillin-clavulanate 875-125mg (AUGMENTIN) 875-125 mg per tablet; Take 1 tablet by mouth every 12 (twelve) hours. for 7 days  Dispense: 14 tablet; Refill: 0  -     fluticasone propionate (FLONASE) 50 mcg/actuation nasal spray; 1 spray (50 mcg total) by Each Nostril route once daily.  Dispense: 1 each; Refill: 0    1. Recommend to start antibiotic as well as nasal spray, sent to pharmacy. Please take as directed.  2. Push fluids. Can use tylenol as needed for fever or pain control. Can also use warm heating pad/compresses over ear and sinus for relief.   3. Recommend no swimming until resolution of symptoms. Cotton ball in ear while showering.  4. Follow up with urgent care or primary care provider in 2-3 days if no improvement, sooner if worsening or new symptoms.  5. You must understand that you've received a Telehealth Urgent Care treatment only and that you may be released before all your medical problems are known or treated. You, the patient, will arrange for follow up care as instructed.  ?Patient voiced understanding and agrees to plan.

## 2024-05-14 DIAGNOSIS — K75.4 AUTOIMMUNE HEPATITIS: ICD-10-CM

## 2024-05-14 DIAGNOSIS — Z79.60 LONG-TERM USE OF IMMUNOSUPPRESSANT MEDICATION: ICD-10-CM

## 2024-05-14 RX ORDER — AZATHIOPRINE 50 MG/1
100 TABLET ORAL DAILY
Qty: 180 TABLET | Refills: 3 | Status: SHIPPED | OUTPATIENT
Start: 2024-05-14

## 2024-06-17 DIAGNOSIS — K75.4 AUTOIMMUNE HEPATITIS: ICD-10-CM

## 2024-06-17 DIAGNOSIS — Z79.60 LONG-TERM USE OF IMMUNOSUPPRESSANT MEDICATION: ICD-10-CM

## 2024-06-17 RX ORDER — BUDESONIDE 3 MG/1
9 CAPSULE, COATED PELLETS ORAL DAILY
Qty: 270 CAPSULE | Refills: 3 | Status: SHIPPED | OUTPATIENT
Start: 2024-06-17

## 2024-09-23 ENCOUNTER — LAB VISIT (OUTPATIENT)
Dept: LAB | Facility: HOSPITAL | Age: 34
End: 2024-09-23
Payer: COMMERCIAL

## 2024-09-23 ENCOUNTER — PATIENT MESSAGE (OUTPATIENT)
Dept: HEPATOLOGY | Facility: CLINIC | Age: 34
End: 2024-09-23

## 2024-09-23 ENCOUNTER — OFFICE VISIT (OUTPATIENT)
Dept: HEPATOLOGY | Facility: CLINIC | Age: 34
End: 2024-09-23
Payer: COMMERCIAL

## 2024-09-23 ENCOUNTER — TELEPHONE (OUTPATIENT)
Dept: HEPATOLOGY | Facility: CLINIC | Age: 34
End: 2024-09-23

## 2024-09-23 DIAGNOSIS — K75.4 AUTOIMMUNE HEPATITIS: ICD-10-CM

## 2024-09-23 DIAGNOSIS — K74.01 EARLY HEPATIC FIBROSIS: ICD-10-CM

## 2024-09-23 DIAGNOSIS — Z79.899 IMMUNOSUPPRESSION DUE TO DRUG THERAPY: ICD-10-CM

## 2024-09-23 DIAGNOSIS — R74.8 ELEVATED LIVER ENZYMES: ICD-10-CM

## 2024-09-23 DIAGNOSIS — D84.821 IMMUNOSUPPRESSION DUE TO DRUG THERAPY: ICD-10-CM

## 2024-09-23 DIAGNOSIS — K75.4 AUTOIMMUNE HEPATITIS: Primary | ICD-10-CM

## 2024-09-23 LAB
ALBUMIN SERPL BCP-MCNC: 3.7 G/DL (ref 3.5–5.2)
ALP SERPL-CCNC: 88 U/L (ref 55–135)
ALT SERPL W/O P-5'-P-CCNC: 66 U/L (ref 10–44)
ANION GAP SERPL CALC-SCNC: 6 MMOL/L (ref 8–16)
AST SERPL-CCNC: 56 U/L (ref 10–40)
BASOPHILS # BLD AUTO: 0.02 K/UL (ref 0–0.2)
BASOPHILS NFR BLD: 0.5 % (ref 0–1.9)
BILIRUB SERPL-MCNC: 0.6 MG/DL (ref 0.1–1)
BUN SERPL-MCNC: 10 MG/DL (ref 6–20)
CALCIUM SERPL-MCNC: 9.2 MG/DL (ref 8.7–10.5)
CHLORIDE SERPL-SCNC: 108 MMOL/L (ref 95–110)
CHOLEST SERPL-MCNC: 184 MG/DL (ref 120–199)
CHOLEST/HDLC SERPL: 3 {RATIO} (ref 2–5)
CO2 SERPL-SCNC: 25 MMOL/L (ref 23–29)
CREAT SERPL-MCNC: 0.8 MG/DL (ref 0.5–1.4)
DIFFERENTIAL METHOD BLD: ABNORMAL
EOSINOPHIL # BLD AUTO: 0.1 K/UL (ref 0–0.5)
EOSINOPHIL NFR BLD: 2.2 % (ref 0–8)
ERYTHROCYTE [DISTWIDTH] IN BLOOD BY AUTOMATED COUNT: 14.6 % (ref 11.5–14.5)
EST. GFR  (NO RACE VARIABLE): >60 ML/MIN/1.73 M^2
ESTIMATED AVG GLUCOSE: 82 MG/DL (ref 68–131)
GLUCOSE SERPL-MCNC: 84 MG/DL (ref 70–110)
HBA1C MFR BLD: 4.5 % (ref 4–5.6)
HCT VFR BLD AUTO: 33.7 % (ref 37–48.5)
HDLC SERPL-MCNC: 62 MG/DL (ref 40–75)
HDLC SERPL: 33.7 % (ref 20–50)
HGB BLD-MCNC: 11.1 G/DL (ref 12–16)
IGG SERPL-MCNC: 900 MG/DL (ref 650–1600)
IMM GRANULOCYTES # BLD AUTO: 0.02 K/UL (ref 0–0.04)
IMM GRANULOCYTES NFR BLD AUTO: 0.5 % (ref 0–0.5)
LDLC SERPL CALC-MCNC: 110.2 MG/DL (ref 63–159)
LYMPHOCYTES # BLD AUTO: 1 K/UL (ref 1–4.8)
LYMPHOCYTES NFR BLD: 25.3 % (ref 18–48)
MCH RBC QN AUTO: 31.4 PG (ref 27–31)
MCHC RBC AUTO-ENTMCNC: 32.9 G/DL (ref 32–36)
MCV RBC AUTO: 95 FL (ref 82–98)
MONOCYTES # BLD AUTO: 0.3 K/UL (ref 0.3–1)
MONOCYTES NFR BLD: 7.6 % (ref 4–15)
NEUTROPHILS # BLD AUTO: 2.6 K/UL (ref 1.8–7.7)
NEUTROPHILS NFR BLD: 63.9 % (ref 38–73)
NONHDLC SERPL-MCNC: 122 MG/DL
NRBC BLD-RTO: 0 /100 WBC
PLATELET # BLD AUTO: 62 K/UL (ref 150–450)
PMV BLD AUTO: 10.4 FL (ref 9.2–12.9)
POTASSIUM SERPL-SCNC: 3.9 MMOL/L (ref 3.5–5.1)
PROT SERPL-MCNC: 6.5 G/DL (ref 6–8.4)
RBC # BLD AUTO: 3.54 M/UL (ref 4–5.4)
SODIUM SERPL-SCNC: 139 MMOL/L (ref 136–145)
TRIGL SERPL-MCNC: 59 MG/DL (ref 30–150)
TSH SERPL DL<=0.005 MIU/L-ACNC: 3.31 UIU/ML (ref 0.4–4)
WBC # BLD AUTO: 4.07 K/UL (ref 3.9–12.7)

## 2024-09-23 PROCEDURE — 83036 HEMOGLOBIN GLYCOSYLATED A1C: CPT | Performed by: NURSE PRACTITIONER

## 2024-09-23 PROCEDURE — 80299 QUANTITATIVE ASSAY DRUG: CPT | Performed by: NURSE PRACTITIONER

## 2024-09-23 PROCEDURE — 1159F MED LIST DOCD IN RCRD: CPT | Mod: CPTII,95,, | Performed by: NURSE PRACTITIONER

## 2024-09-23 PROCEDURE — 80053 COMPREHEN METABOLIC PANEL: CPT | Performed by: NURSE PRACTITIONER

## 2024-09-23 PROCEDURE — 1160F RVW MEDS BY RX/DR IN RCRD: CPT | Mod: CPTII,95,, | Performed by: NURSE PRACTITIONER

## 2024-09-23 PROCEDURE — 99214 OFFICE O/P EST MOD 30 MIN: CPT | Mod: 95,,, | Performed by: NURSE PRACTITIONER

## 2024-09-23 PROCEDURE — 84443 ASSAY THYROID STIM HORMONE: CPT | Performed by: NURSE PRACTITIONER

## 2024-09-23 PROCEDURE — 85025 COMPLETE CBC W/AUTO DIFF WBC: CPT | Performed by: NURSE PRACTITIONER

## 2024-09-23 PROCEDURE — 82784 ASSAY IGA/IGD/IGG/IGM EACH: CPT | Performed by: NURSE PRACTITIONER

## 2024-09-23 PROCEDURE — 80061 LIPID PANEL: CPT | Performed by: NURSE PRACTITIONER

## 2024-09-23 NOTE — TELEPHONE ENCOUNTER
Two attempts made to patient to schedule US. Message sent to patient in the portal regarding scheduled US today per Kelly scheduled at 2 pm.

## 2024-09-23 NOTE — TELEPHONE ENCOUNTER
----- Message from Kelly Claire NP sent at 9/23/2024  9:58 AM CDT -----  Please contact patient to schedule US now at [facility] or [facility]. Thank You!

## 2024-09-23 NOTE — Clinical Note
Please contact patient to schedule US now at main campus or [facility]. Thank You!

## 2024-09-23 NOTE — PROGRESS NOTES
The patient location is: [place]  The chief complaint leading to consultation is: Autoimmune Hepatitis    Visit type: audiovisual    Face to Face time with patient: 13  30 minutes of total time spent on the encounter, which includes face to face time and non-face to face time preparing to see the patient (eg, review of tests), Obtaining and/or reviewing separately obtained history, Documenting clinical information in the electronic or other health record, Independently interpreting results (not separately reported) and communicating results to the patient/family/caregiver, or Care coordination (not separately reported).     Each patient to whom he or she provides medical services by telemedicine is:  (1) informed of the relationship between the physician and patient and the respective role of any other health care provider with respect to management of the patient; and (2) notified that he or she may decline to receive medical services by telemedicine and may withdraw from such care at any time.    Notes:     [facility] VISIT ESTABLISHED PT NOTE    REFERRING PROVIDER:  No ref. provider found    CHIEF COMPLAINT: Autoimmune Hepatitis    HPI: This is a 34 y.o. White female with PMH noted below, presenting for follow up for autoimmune hepatitis. She was previously followed by a Hepatologist in [place], and was last seen in clinic by myself in 1/2024. She was diagnosed with autoimmune hepatitis at age 7, previously treated with Azathioprine and steroids. She is now on Budesonide 9 mg daily (increased after last visit in 12/2022), in addition to Azathioprine (100 mg daily - dose increased after visit in September 2022).   Family history is significant for Father with a history of alcohol abuse. She also has a history of alcohol abuse, and illicit drug use; sober since 2018. She denies current illicit drug use. HCV negative on prior labs in 2015. She is immune to Hepatitis A and B, through prior vaccination.      Liver biopsy in 2006 (no evidence of significant fibrosis, per patient report). Fibroscan to restage her liver disease in 9/2022 was suggestive of no significant hepatic steatosis with F0-F1 fibrosis and a low likelihood of cirrhosis. Abdominal US in 9/2022 showed no focal liver lesions, with mild splenomegaly (13 cm). US also showed small gallbladder cholesterol crystals, & borderline dilatation of the CBD (7 mm).     Due to chronic thrombocytopenia, she underwent liver biopsy in 12/2023 for restaging purposes. Pathology results were consistent with treated/partially treated AIH with stage 2 (moderate) fibrosis, as below:    Final Pathologic Diagnosis Liver, random (needle biopsy):  Mild portal lymphoplasmacytic inflammation  Minimal interface or lobular activity.  See comment  Mild ductular proliferation with willie biliary neutrophils.  See comment  Scattered portal and lobular macrophages, may represent response to prior injury  Periportal fibrosis with incomplete bridging (stage 2 of 4)  No significant steatosis  No hepatocyte iron    Comment:  Specimen is adequate for evaluation.  Sections show patchy mild portal lymphoplasmacytic inflammation with minimal interface and lobular activity. There are scattered portal and lobular macrophages which may represent response to prior injury.  The findings are non-specific, but given the patient's clinical history of autoimmune hepatitis (AIH), the findings are compatible with treated/partially treated autoimmune hepatitis. There is a mild ductulare reaction with peribiliary neutrophils.    This may represent reaction to prior interface activity, duct issue/obstruction, infection, drug effect.    The trichrome highlights variable fibrosis involvement: Periportal fibrosis, incomplete bridging and focal/rare nodule formation.  Overall, the findings are most compatible with stage 2 of 4 fibrosis.       Most recent labs drawn in 3/2024 showed elevation in ALT and AST  again, but synthetic function was normal. She notes that she did miss some doses of her medication. She is back to taking  treatment, as prescribed.    She is well appearing, and has no signs or symptoms of hepatic decompensation including jaundice, dark urine, abdominal distention, lower extremity, hematemesis, melena, or periods of confusion suggestive of hepatic encephalopathy. No abnormal skin rashes, or generalized joint pain.     Review of patient's allergies indicates:  No Known Allergies    Current Outpatient Medications on File Prior to Visit   Medication Sig Dispense Refill    azaTHIOprine (IMURAN) 50 mg Tab Take 2 tablets (100 mg total) by mouth once daily. 180 tablet 3    BINAXNOW COVID-19 AG SELF TEST Kit TEST AS DIRECTED TODAY      budesonide (ENTOCORT EC) 3 mg capsule Take 3 capsules (9 mg total) by mouth once daily. 270 capsule 3    buPROPion (WELLBUTRIN XL) 300 MG 24 hr tablet Take 300 mg by mouth once daily.      copper intrauterine device (PARAGARD) 380 square mm IUD 1 Intra Uterine Device by Intrauterine route once.      FLUoxetine 20 MG capsule Take 20 mg by mouth once daily. Currently taking 10 mg for 1 week the 20 mg daily      FLUoxetine 40 MG capsule Take 40 mg by mouth.      fluticasone propionate (FLONASE) 50 mcg/actuation nasal spray 1 spray (50 mcg total) by Each Nostril route once daily. 1 each 0     No current facility-administered medications on file prior to visit.     PMHX:  has a past medical history of Anxiety, Auto immune neutropenia, Autoimmune hepatitis, and Depression.    PSHX:  has a past surgical history that includes Liver biopsy.    FAMILY HISTORY: Negative for liver disease, reviewed in EPIC    SOCIAL HISTORY:   Social History     Tobacco Use   Smoking Status Former    Types: Cigarettes   Smokeless Tobacco Former     Social History     Substance and Sexual Activity   Alcohol Use Not Currently    Comment: History of alcohol abuse, sober since 2018.     Social History      Substance and Sexual Activity   Drug Use Not Currently    Types: Marijuana     ROS:   GENERAL: Denies fever, chills, weight loss/gain, fatigue  HEENT: Denies headaches, dizziness, vision/hearing changes  CARDIOVASCULAR: Denies chest pain, palpitations, or edema  RESPIRATORY: Denies dyspnea, cough  GI: Denies abdominal pain, rectal bleeding, nausea, vomiting. No change in bowel pattern or color  : Denies dysuria, hematuria   SKIN: Denies rash, itching   NEURO: Denies confusion, memory loss, or mood changes  PSYCH: Denies depression or anxiety  HEME/LYMPH: Denies easy bruising or bleeding    PHYSICAL EXAM:   Friendly White female, in no acute distress; alert and oriented to person, place and time.  VITALS: There were no vitals taken for this visit.   HENT: Normocephalic, without obvious abnormality.   EYES: Sclerae anicteric.   NECK: No obvious masses.  CARDIOVASCULAR: MARC.  RESPIRATORY: Normal respiratory effort.  GI: MARC.  EXTREMITIES: MARC.  SKIN:  No jaundice.   NEURO:  No asterixis.  PSYCH:  Memory intact. Thought and speech pattern appropriate. Behavior normal. No depression or anxiety noted.    DIAGNOSTIC STUDIES:    FIBROSCAN 9/6/2022:    Findings  Median liver stiffness score:  5.3  CAP Reading: dB/m:  234     IQR/med %:  8  Interpretation  Fibrosis interpretation is based on medial liver stiffness - Kilopascal (kPa).     Fibrosis Stage:  F 0-1  Steatosis interpretation is based on controlled attenuation parameter - (dB/m).     Steatosis Grade:  S1    US ABDOMEN COMPLETE 9/6/2022:    FINDINGS:    Pancreas: The visualized portions of pancreas appear normal.     Aorta: No aneurysm.     Liver:  Normal in size, measuring 14.9 cm.  Heterogenous/coarse parenchymal echotexture. No focal lesions.  HRI measures 1.4.     Gallbladder: Small echogenic foci, likely cholesterol crystals.  No shadowing stones.  No wall thickening or pericholecystic fluid.  Negative sonographic Gray's sign.     Biliary system:  Borderline dilated measuring 7 mm at maximum diameter.  No intrahepatic ductal dilatation.     Inferior vena cava: Normal in appearance.     Right kidney: 10.7 cm. No stones, solid mass, or hydronephrosis.     Left kidney: 11.3 cm.  No stones, solid mass, or hydronephrosis.     Spleen:  Enlarged measuring 13 x 5 cm.  Homogeneous architecture.     Miscellaneous: No ascites.     Impression:     Heterogenous hepatic echotexture in patient with history of autoimmune hepatitis.     Small gallbladder cholesterol crystals.  No shadowing stones.     Borderline dilatation of the common bile duct.     Splenomegaly.    LIVER PATHOLOGY 12/12/2023:    Final Pathologic Diagnosis Liver, random (needle biopsy):  Mild portal lymphoplasmacytic inflammation  Minimal interface or lobular activity.  See comment  Mild ductular proliferation with willie biliary neutrophils.  See comment  Scattered portal and lobular macrophages, may represent response to prior injury  Periportal fibrosis with incomplete bridging (stage 2 of 4)  No significant steatosis  No hepatocyte iron    Comment:  Specimen is adequate for evaluation.  Sections show patchy mild portal lymphoplasmacytic inflammation with minimal interface and lobular activity. There are scattered portal and lobular macrophages which may represent response to prior injury.   The findings are non-specific, but given the patient's clinical history of autoimmune hepatitis (AIH), the findings are compatible with treated/partially treated autoimmune hepatitis. There is a mild ductulare reaction with peribiliary neutrophils.    This may represent reaction to prior interface activity, duct issue/obstruction, infection, drug effect.    The trichrome highlights variable fibrosis involvement: Periportal fibrosis, incomplete bridging and focal/rare nodule formation.  Overall, the findings are most compatible with stage 2 of 4 fibrosis.       ASSESSMENT & PLAN:  34 y.o. White female with:      1.  Autoimmune hepatitis  Comprehensive Metabolic Panel    CBC Auto Differential    US Abdomen Complete    IgG    PRO-Predict Metabolites (thiopurine therapy)    TSH    Hemoglobin A1C    Lipid Panel      2. Early hepatic fibrosis  Comprehensive Metabolic Panel    CBC Auto Differential    US Abdomen Complete    IgG    PRO-Predict Metabolites (thiopurine therapy)    TSH    Hemoglobin A1C    Lipid Panel      3. Elevated liver enzymes  Comprehensive Metabolic Panel    CBC Auto Differential    US Abdomen Complete    IgG    PRO-Predict Metabolites (thiopurine therapy)    TSH    Hemoglobin A1C    Lipid Panel      4. Immunosuppression due to drug therapy  Comprehensive Metabolic Panel    CBC Auto Differential    US Abdomen Complete    IgG    PRO-Predict Metabolites (thiopurine therapy)    TSH    Hemoglobin A1C    Lipid Panel        - Continue Azathioprine 100 mg PO daily without missing any doses.  - Continue Budesonide 9 mg daily without missing any doses.   - Repeat labs now to monitor blood counts, liver and kidney function tests.  - Recommend US of the liver every 2 years, due now.  - Continue to remain abstinent from alcohol and illicit drugs.   - Recommend annual dermatology visits for skin cancer screenings.  - Recommend to establish care with new PCP.   - Return to clinic to be determined by lab and US results, likely 6 months.    Thank you for allowing me to participate in the care of Eliseo Ybarra       Hepatology Nurse Practitioner  [facility]    CC'ed note to:   No ref. provider found  Stevie Seaman MD (Inactive)

## 2024-09-26 DIAGNOSIS — D84.821 IMMUNOSUPPRESSION DUE TO DRUG THERAPY: ICD-10-CM

## 2024-09-26 DIAGNOSIS — Z79.899 IMMUNOSUPPRESSION DUE TO DRUG THERAPY: ICD-10-CM

## 2024-09-26 DIAGNOSIS — K74.01 EARLY HEPATIC FIBROSIS: ICD-10-CM

## 2024-09-26 DIAGNOSIS — R74.8 ELEVATED LIVER ENZYMES: ICD-10-CM

## 2024-09-26 DIAGNOSIS — K75.4 AUTOIMMUNE HEPATITIS: Primary | ICD-10-CM

## 2024-09-26 LAB
6-TGN ENTSUB RBC: 77 PMOL/8X10(8)RBC (ref 235–450)
6MMP ENTSUB RBC: <397 PMOL/8X10(8)RBC

## 2024-09-27 ENCOUNTER — TELEPHONE (OUTPATIENT)
Dept: HEPATOLOGY | Facility: CLINIC | Age: 34
End: 2024-09-27
Payer: COMMERCIAL

## 2024-09-27 ENCOUNTER — PATIENT MESSAGE (OUTPATIENT)
Dept: HEPATOLOGY | Facility: CLINIC | Age: 34
End: 2024-09-27
Payer: COMMERCIAL

## 2024-09-27 NOTE — TELEPHONE ENCOUNTER
Called patient to discuss Ms. Mendoza's request for lab work in 3 months. Patient didn't answer but was notified she had been scheduled December 27, 2024 and to call to reschedule if the date/time doesn't work for her. Mailed appointment reminder to patient.

## 2024-09-27 NOTE — TELEPHONE ENCOUNTER
----- Message from Kelly Claire NP sent at 9/26/2024 10:49 AM CDT -----  Please contact patient to arrange repeat labs in 3 months. Orders in Epic. Thank you.

## 2024-10-15 ENCOUNTER — TELEPHONE (OUTPATIENT)
Dept: OBSTETRICS AND GYNECOLOGY | Facility: CLINIC | Age: 34
End: 2024-10-15
Payer: COMMERCIAL

## 2024-10-17 ENCOUNTER — ON-DEMAND VIRTUAL (OUTPATIENT)
Dept: URGENT CARE | Facility: CLINIC | Age: 34
End: 2024-10-17
Payer: COMMERCIAL

## 2024-10-17 DIAGNOSIS — R39.9 UTI SYMPTOMS: Primary | ICD-10-CM

## 2024-10-17 PROCEDURE — 99213 OFFICE O/P EST LOW 20 MIN: CPT | Mod: 95,,, | Performed by: NURSE PRACTITIONER

## 2024-10-17 RX ORDER — SULFAMETHOXAZOLE AND TRIMETHOPRIM 800; 160 MG/1; MG/1
1 TABLET ORAL 2 TIMES DAILY
Qty: 6 TABLET | Refills: 0 | Status: SHIPPED | OUTPATIENT
Start: 2024-10-17 | End: 2024-10-20

## 2024-10-17 NOTE — PROGRESS NOTES
Subjective:      Patient ID: Eliseo Ybarra is a 34 y.o. female.    Vitals:  vitals were not taken for this visit.     Chief Complaint: Dysuria      Visit Type: TELE AUDIOVISUAL - This visit was conducted virtually based on  subjective information and limited objective exam    Present with the patient at the time of consultation: TELEMED PRESENT WITH PATIENT: None  Two patient identifiers used to verify patient- saying out date of birth and full name.       Past Medical History:   Diagnosis Date    Anxiety     Auto immune neutropenia     Autoimmune hepatitis     Depression      Past Surgical History:   Procedure Laterality Date    LIVER BIOPSY       Review of patient's allergies indicates:  No Known Allergies  Current Outpatient Medications on File Prior to Visit   Medication Sig Dispense Refill    azaTHIOprine (IMURAN) 50 mg Tab Take 2 tablets (100 mg total) by mouth once daily. 180 tablet 3    BINAXNOW COVID-19 AG SELF TEST Kit TEST AS DIRECTED TODAY      budesonide (ENTOCORT EC) 3 mg capsule Take 3 capsules (9 mg total) by mouth once daily. 270 capsule 3    buPROPion (WELLBUTRIN XL) 300 MG 24 hr tablet Take 300 mg by mouth once daily.      copper intrauterine device (PARAGARD) 380 square mm IUD 1 Intra Uterine Device by Intrauterine route once.      FLUoxetine 20 MG capsule Take 20 mg by mouth once daily. Currently taking 10 mg for 1 week the 20 mg daily      FLUoxetine 40 MG capsule Take 40 mg by mouth.      fluticasone propionate (FLONASE) 50 mcg/actuation nasal spray 1 spray (50 mcg total) by Each Nostril route once daily. 1 each 0     No current facility-administered medications on file prior to visit.     Family History   Problem Relation Name Age of Onset    Depression Mother      Hyperlipidemia Mother      Melanoma Father      Alcohol abuse Father      Cancer Father      Depression Father      Depression Sister      Depression Brother      Depression Brother      Cancer Maternal Aunt      Depression  Maternal Grandfather      ALS Maternal Grandfather      Vitiligo Maternal Grandfather      Depression Paternal Grandmother      Cancer Paternal Grandfather      Depression Paternal Grandfather      Breast cancer Neg Hx      Colon cancer Neg Hx      Ovarian cancer Neg Hx         Medications Ordered                [id number] - [place], [place] - [address]   [address] [facility] [address]    Telephone: [phone]   Fax: [phone]   Hours: Not open 24 hours                         E-Prescribed (1 of 1)              sulfamethoxazole-trimethoprim 800-160mg (BACTRIM DS) 800-160 mg Tab    Sig: Take 1 tablet by mouth 2 (two) times daily. for 3 days       Start: 10/17/24     Quantity: 6 tablet Refills: 0                           Ohs Peq Odvv Intake    10/17/2024  6:47 AM CDT - Filed by Patient   What is your current physical address in the event of a medical emergency? [facility] [place], [address]   Are you able to take your vital signs? No   Please attach any relevant images or files    Is your employer contracted with [facility]? No         35 yo female with c/o uti symptoms for three days. She states burning when urinating and urgency and frequency. She states sthis morning she has itching and discomfort. She states she has tried azo. She denies hematuria. Denies abdominal pain and back pain. She denies pregnancy.         Constitution: Negative. Negative for fever.   HENT: Negative.     Neck: neck negative.   Cardiovascular: Negative.    Respiratory: Negative.     Gastrointestinal: Negative.  Negative for abdominal pain, nausea and vomiting.   Endocrine: negative.   Genitourinary:  Positive for dysuria, frequency and urgency. Negative for vaginal discharge, vaginal odor and genital sore.   Musculoskeletal: Negative.  Negative for back pain.   Skin: Negative.    Neurological: Negative.         Objective:   The  physical exam was conducted virtually.  LOCATION OF PATIENT [place]  AA[place] x 3 ; no acute distress noted; appearance normal; mood and behavior normal; thought process normal  Head- normocephalic  Nose- appears normal, no discharge or erythema  Eyes- pupils appear normal in size, no drainage, no erythema  Ears- normal appearing; no discharge, no erythema  Mouth- appears normal  Oropharynx- no erythema, lesions  Lungs- breathing at a normal rate, no acute distress noted  Heart- no reports of tachycardia, palpitations, chest pain  Abdomen- non distended, non tender reported by patient  Skin- warm and dry, no erythema or edema noted by patient or visualized  Psych- as above; no si/hi      Assessment:     1. UTI symptoms        Plan:             ASSESSMENT: UTI uncomplicated without evidence of pyelonephritis    PLAN: Bactrim DS x 3 days- also push fluids, may use Pyridium OTC prn. Call or return to clinic prn if these symptoms worsen or fail to improve as anticipated.    Thank you for choosing [facility]!    Our goal in the [facility] is to always provide outstanding medical care. You may receive a survey by mail or e-mail in the next week regarding your experience today. We would greatly appreciate you completing and returning the survey. Your feedback provides us with a way to recognize our staff who provide very good care, and it helps us learn how to improve when your experience was below our aspiration of excellence.         We appreciate you trusting us with your medical care. We hope you feel better soon. We will be happy to take care of you for all of your future medical needs.    You must understand that you've received an Urgent Care treatment only and that you may be released before all your medical problems are known or treated. You, the patient, will arrange for follow up care as instructed.    Follow up with your PCP or specialty clinic as directed in the next  1-2 weeks if not improved or as needed.  You can call [phone] to schedule an appointment with the appropriate provider.    If your condition worsens we recommend that you receive another evaluation in person, with your primary care provider, urgent care or at the emergency room immediately or contact your primary medical clinics after hours call service to discuss your concerns.         UTI symptoms  -     sulfamethoxazole-trimethoprim 800-160mg (BACTRIM DS) 800-160 mg Tab; Take 1 tablet by mouth 2 (two) times daily. for 3 days  Dispense: 6 tablet; Refill: 0

## 2024-10-20 DIAGNOSIS — K75.4 AUTOIMMUNE HEPATITIS: ICD-10-CM

## 2024-10-20 DIAGNOSIS — Z79.60 LONG-TERM USE OF IMMUNOSUPPRESSANT MEDICATION: ICD-10-CM

## 2024-10-21 RX ORDER — AZATHIOPRINE 50 MG/1
100 TABLET ORAL DAILY
Qty: 180 TABLET | Refills: 3 | Status: SHIPPED | OUTPATIENT
Start: 2024-10-21

## 2024-11-04 ENCOUNTER — OFFICE VISIT (OUTPATIENT)
Dept: OBSTETRICS AND GYNECOLOGY | Facility: CLINIC | Age: 34
End: 2024-11-04
Payer: COMMERCIAL

## 2024-11-04 VITALS — SYSTOLIC BLOOD PRESSURE: 96 MMHG | BODY MASS INDEX: 21.92 KG/M2 | DIASTOLIC BLOOD PRESSURE: 68 MMHG | HEIGHT: 67 IN

## 2024-11-04 DIAGNOSIS — Z01.419 WELL WOMAN EXAM: Primary | ICD-10-CM

## 2024-11-04 DIAGNOSIS — R30.0 DYSURIA: ICD-10-CM

## 2024-11-04 DIAGNOSIS — N94.10 DYSPAREUNIA IN FEMALE: ICD-10-CM

## 2024-11-04 PROCEDURE — 99395 PREV VISIT EST AGE 18-39: CPT | Mod: 25,S$GLB,, | Performed by: STUDENT IN AN ORGANIZED HEALTH CARE EDUCATION/TRAINING PROGRAM

## 2024-11-04 PROCEDURE — 3078F DIAST BP <80 MM HG: CPT | Mod: CPTII,S$GLB,, | Performed by: STUDENT IN AN ORGANIZED HEALTH CARE EDUCATION/TRAINING PROGRAM

## 2024-11-04 PROCEDURE — 58301 REMOVE INTRAUTERINE DEVICE: CPT | Mod: S$GLB,,, | Performed by: STUDENT IN AN ORGANIZED HEALTH CARE EDUCATION/TRAINING PROGRAM

## 2024-11-04 PROCEDURE — 3008F BODY MASS INDEX DOCD: CPT | Mod: CPTII,S$GLB,, | Performed by: STUDENT IN AN ORGANIZED HEALTH CARE EDUCATION/TRAINING PROGRAM

## 2024-11-04 PROCEDURE — 87624 HPV HI-RISK TYP POOLED RSLT: CPT | Performed by: STUDENT IN AN ORGANIZED HEALTH CARE EDUCATION/TRAINING PROGRAM

## 2024-11-04 PROCEDURE — 1159F MED LIST DOCD IN RCRD: CPT | Mod: CPTII,S$GLB,, | Performed by: STUDENT IN AN ORGANIZED HEALTH CARE EDUCATION/TRAINING PROGRAM

## 2024-11-04 PROCEDURE — 88175 CYTOPATH C/V AUTO FLUID REDO: CPT | Performed by: STUDENT IN AN ORGANIZED HEALTH CARE EDUCATION/TRAINING PROGRAM

## 2024-11-04 PROCEDURE — 87491 CHLMYD TRACH DNA AMP PROBE: CPT | Performed by: STUDENT IN AN ORGANIZED HEALTH CARE EDUCATION/TRAINING PROGRAM

## 2024-11-04 PROCEDURE — 99999 PR PBB SHADOW E&M-EST. PATIENT-LVL III: CPT | Mod: PBBFAC,,, | Performed by: STUDENT IN AN ORGANIZED HEALTH CARE EDUCATION/TRAINING PROGRAM

## 2024-11-04 PROCEDURE — 87086 URINE CULTURE/COLONY COUNT: CPT | Performed by: STUDENT IN AN ORGANIZED HEALTH CARE EDUCATION/TRAINING PROGRAM

## 2024-11-04 PROCEDURE — 3044F HG A1C LEVEL LT 7.0%: CPT | Mod: CPTII,S$GLB,, | Performed by: STUDENT IN AN ORGANIZED HEALTH CARE EDUCATION/TRAINING PROGRAM

## 2024-11-04 PROCEDURE — 3074F SYST BP LT 130 MM HG: CPT | Mod: CPTII,S$GLB,, | Performed by: STUDENT IN AN ORGANIZED HEALTH CARE EDUCATION/TRAINING PROGRAM

## 2024-11-04 RX ORDER — BUPROPION HYDROCHLORIDE 150 MG/1
150 TABLET ORAL EVERY MORNING
COMMUNITY
Start: 2024-09-18 | End: 2024-11-04 | Stop reason: SDUPTHER

## 2024-11-04 RX ORDER — DIPHENHYDRAMINE HCL 25 MG
4 CAPSULE ORAL
COMMUNITY

## 2024-11-04 NOTE — PROGRESS NOTES
History & Physical  Gynecology      SUBJECTIVE:     Chief Complaint: possible UTI and Painful [place]      History of Present Illness:   Eliseo Ybarra is a 34 y.o.  who presents for recurrent UTIs and dyspareunia. She is also due for her annual.  States feels like she is always on the brink of a UTI. Recently treated with bactrim but no culture done. Did feel better after the bactrim but feels symptoms return without warning.  She always urinates after sex, doesn't necessarily think it is related.  Also having some dyspareunia for the last 6 months or so. Has been with this partner for 4 years and never had this before. Wondering if her IUD is malpositioned.    She reports her periods are regular interval with normal flow. They are not too painful.  She is sexually active with 1 partner and uses IUD for contraception. Would like STI screening today.  No vaginal discharge, odor, or itching.     She has no history of abnormal pap smear.       Review of patient's allergies indicates:  No Known Allergies     Past Medical History:   Diagnosis Date    Anxiety     Auto immune neutropenia     Autoimmune hepatitis     Depression       Past Surgical History:   Procedure Laterality Date    LIVER BIOPSY          OB History          0    Para   0    Term   0       0    AB   0    Living   0         SAB   0    IAB   0    Ectopic   0    Multiple   0    Live Births   0                  Family History   Problem Relation Name Age of Onset    Depression Mother      Hyperlipidemia Mother      Melanoma Father      Alcohol abuse Father      Cancer Father      Depression Father      Depression Sister      Depression Brother      Depression Brother      Cancer Maternal Aunt      Depression Maternal Grandfather      ALS Maternal Grandfather      Vitiligo Maternal Grandfather      Depression Paternal Grandmother      Cancer Paternal Grandfather      Depression Paternal Grandfather      Breast cancer Neg Hx       Colon cancer Neg Hx      Ovarian cancer Neg Hx          Social History     Tobacco Use    Smoking status: Former     Types: Cigarettes    Smokeless tobacco: Former   Substance Use Topics    Alcohol use: Not Currently     Comment: History of alcohol abuse, sober since 2018.    Drug use: Not Currently     Types: Marijuana          Current Outpatient Medications   Medication Sig    azaTHIOprine (IMURAN) 50 mg Tab Take 2 tablets (100 mg total) by mouth once daily.    budesonide (ENTOCORT EC) 3 mg capsule Take 3 capsules (9 mg total) by mouth once daily.    buPROPion (WELLBUTRIN XL) 300 MG 24 hr tablet Take 300 mg by mouth once daily.    copper intrauterine device (PARAGARD) 380 square mm IUD 1 Intra Uterine Device by Intrauterine route once.    FLUoxetine 20 MG capsule Take 20 mg by mouth once daily. Currently taking 10 mg for 1 week the 20 mg daily    nicotine polacrilex (NICORETTE) 4 MG Gum Take 4 mg by mouth as needed.    fluticasone propionate (FLONASE) 50 mcg/actuation nasal spray 1 spray (50 mcg total) by Each Nostril route once daily. (Patient not taking: Reported on 11/4/2024)     No current facility-administered medications for this visit.            Review of Systems:  Review of Systems   Constitutional:  Negative for chills and fever.   Respiratory:  Negative for shortness of breath.    Cardiovascular:  Negative for chest pain.   Gastrointestinal:  Negative for abdominal pain, constipation, diarrhea, nausea and vomiting.   Genitourinary:  Positive for dyspareunia and dysuria. Negative for menstrual problem, pelvic pain, vaginal bleeding, vaginal discharge and vaginal odor.   Integumentary:  Negative for breast mass, nipple discharge and breast skin changes.   Neurological:  Negative for headaches.   Breast: Negative for mass, nipple discharge and skin changes           OBJECTIVE:     Physical Exam:   Physical Exam  Vitals reviewed. Exam conducted with a chaperone present.   Constitutional:       General: She  is not in acute distress.     Appearance: Normal appearance. She is well-developed. She is not ill-appearing or toxic-appearing.   HENT:      Head: Normocephalic and atraumatic.      Nose: Nose normal.   Eyes:      Extraocular Movements: Extraocular movements intact.      Conjunctiva/sclera: Conjunctivae normal.   Cardiovascular:      Rate and Rhythm: Normal rate.   Pulmonary:      Effort: Pulmonary effort is normal. No respiratory distress.   Chest:   Breasts:     Right: Normal. No swelling, bleeding, inverted nipple, mass, nipple discharge, skin change or tenderness.      Left: Normal. No swelling, bleeding, inverted nipple, mass, nipple discharge, skin change or tenderness.   Abdominal:      Palpations: Abdomen is soft. There is no mass.      Tenderness: There is no abdominal tenderness. There is no guarding or rebound.   Genitourinary:     Vagina: Normal. No vaginal discharge or bleeding.      Cervix: No cervical motion tenderness.      Uterus: Not enlarged and not tender.       Adnexa:         Right: No mass, tenderness or fullness.          Left: No mass, tenderness or fullness.        Comments: Body of IUD visible at external cervical os.  Musculoskeletal:         General: Normal range of motion.      Cervical back: Normal range of motion.   Skin:     General: Skin is warm and dry.   Neurological:      Mental Status: She is alert. Mental status is at baseline.   Psychiatric:         Mood and Affect: Mood normal.         Behavior: Behavior normal.         Thought Content: Thought content normal.              ASSESSMENT:       ICD-10-CM ICD-9-CM    1. Well woman exam  Z01.419 V72.31 Liquid-Based Pap Smear, Screening      HPV High Risk Genotypes, PCR      C. trachomatis/N. gonorrhoeae by AMP DNA Ifeomasner; Cervicovaginal      2. Dysuria  R30.0 788.1 CULTURE, URINE      3. Dyspareunia in female  N94.10 625.0                  PLAN:     -- UPT negative. IUD body visualized at external os. Recommend removal, discussed  that it is unlikely to be effective in current position and is probably causing her pain/dyspareunia. She verbally agreed to this and the IUD was removed with ring forceps.  -- Will send urine for culture.  -- Pap and GC/CT collected.  -- She is unsure if she would like another IUD. Would like OCPs in the meantime. Has used them before. No contraindications. Rx send and proper used discussed.  -- She will reach out if she wants another IUD. Otherwise RTC in 1 year.      Lia Huynh MD

## 2024-11-05 LAB
BACTERIA UR CULT: NO GROWTH
C TRACH DNA SPEC QL NAA+PROBE: NOT DETECTED
N GONORRHOEA DNA SPEC QL NAA+PROBE: NOT DETECTED

## 2024-11-07 LAB
FINAL PATHOLOGIC DIAGNOSIS: NORMAL
HPV HR 12 DNA SPEC QL NAA+PROBE: NEGATIVE
HPV16 AG SPEC QL: NEGATIVE
HPV18 DNA SPEC QL NAA+PROBE: NEGATIVE
Lab: NORMAL

## 2024-12-02 ENCOUNTER — PATIENT OUTREACH (OUTPATIENT)
Dept: ADMINISTRATIVE | Facility: HOSPITAL | Age: 34
End: 2024-12-02
Payer: COMMERCIAL

## 2024-12-16 ENCOUNTER — OFFICE VISIT (OUTPATIENT)
Dept: INTERNAL MEDICINE | Facility: CLINIC | Age: 34
End: 2024-12-16
Payer: COMMERCIAL

## 2024-12-16 ENCOUNTER — LAB VISIT (OUTPATIENT)
Dept: LAB | Facility: OTHER | Age: 34
End: 2024-12-16
Attending: INTERNAL MEDICINE
Payer: COMMERCIAL

## 2024-12-16 VITALS
SYSTOLIC BLOOD PRESSURE: 97 MMHG | BODY MASS INDEX: 21.92 KG/M2 | DIASTOLIC BLOOD PRESSURE: 60 MMHG | HEART RATE: 84 BPM | OXYGEN SATURATION: 98 % | HEIGHT: 67 IN

## 2024-12-16 DIAGNOSIS — R06.83 SNORING: ICD-10-CM

## 2024-12-16 DIAGNOSIS — K74.01 EARLY HEPATIC FIBROSIS: ICD-10-CM

## 2024-12-16 DIAGNOSIS — K75.4 AUTOIMMUNE HEPATITIS: ICD-10-CM

## 2024-12-16 DIAGNOSIS — G47.19 EXCESSIVE DAYTIME SLEEPINESS: ICD-10-CM

## 2024-12-16 DIAGNOSIS — F32.A DEPRESSION, UNSPECIFIED DEPRESSION TYPE: ICD-10-CM

## 2024-12-16 DIAGNOSIS — R29.818 SUSPECTED SLEEP APNEA: Primary | ICD-10-CM

## 2024-12-16 DIAGNOSIS — Z79.899 IMMUNOSUPPRESSION DUE TO DRUG THERAPY: ICD-10-CM

## 2024-12-16 DIAGNOSIS — D69.6 THROMBOCYTOPENIA: ICD-10-CM

## 2024-12-16 DIAGNOSIS — D84.821 IMMUNOSUPPRESSION DUE TO DRUG THERAPY: ICD-10-CM

## 2024-12-16 DIAGNOSIS — R74.8 ELEVATED LIVER ENZYMES: ICD-10-CM

## 2024-12-16 LAB
ALBUMIN SERPL BCP-MCNC: 4.1 G/DL (ref 3.5–5.2)
ALP SERPL-CCNC: 53 U/L (ref 40–150)
ALT SERPL W/O P-5'-P-CCNC: 47 U/L (ref 10–44)
ANION GAP SERPL CALC-SCNC: 10 MMOL/L (ref 8–16)
AST SERPL-CCNC: 33 U/L (ref 10–40)
BASOPHILS # BLD AUTO: 0.01 K/UL (ref 0–0.2)
BASOPHILS NFR BLD: 0.3 % (ref 0–1.9)
BILIRUB SERPL-MCNC: 0.3 MG/DL (ref 0.1–1)
BUN SERPL-MCNC: 12 MG/DL (ref 6–20)
CALCIUM SERPL-MCNC: 9.3 MG/DL (ref 8.7–10.5)
CHLORIDE SERPL-SCNC: 108 MMOL/L (ref 95–110)
CO2 SERPL-SCNC: 22 MMOL/L (ref 23–29)
CREAT SERPL-MCNC: 0.9 MG/DL (ref 0.5–1.4)
DIFFERENTIAL METHOD BLD: ABNORMAL
EOSINOPHIL # BLD AUTO: 0.1 K/UL (ref 0–0.5)
EOSINOPHIL NFR BLD: 2.4 % (ref 0–8)
ERYTHROCYTE [DISTWIDTH] IN BLOOD BY AUTOMATED COUNT: 16.7 % (ref 11.5–14.5)
EST. GFR  (NO RACE VARIABLE): >60 ML/MIN/1.73 M^2
FERRITIN SERPL-MCNC: 16 NG/ML (ref 20–300)
GLUCOSE SERPL-MCNC: 111 MG/DL (ref 70–110)
HCT VFR BLD AUTO: 33 % (ref 37–48.5)
HGB BLD-MCNC: 10.7 G/DL (ref 12–16)
IGA SERPL-MCNC: 137 MG/DL (ref 40–350)
IGG SERPL-MCNC: 836 MG/DL (ref 650–1600)
IGM SERPL-MCNC: 138 MG/DL (ref 50–300)
IMM GRANULOCYTES # BLD AUTO: 0.02 K/UL (ref 0–0.04)
IMM GRANULOCYTES NFR BLD AUTO: 0.7 % (ref 0–0.5)
IRON SERPL-MCNC: 94 UG/DL (ref 30–160)
LYMPHOCYTES # BLD AUTO: 0.6 K/UL (ref 1–4.8)
LYMPHOCYTES NFR BLD: 19.7 % (ref 18–48)
MCH RBC QN AUTO: 30.4 PG (ref 27–31)
MCHC RBC AUTO-ENTMCNC: 32.4 G/DL (ref 32–36)
MCV RBC AUTO: 94 FL (ref 82–98)
MONOCYTES # BLD AUTO: 0.2 K/UL (ref 0.3–1)
MONOCYTES NFR BLD: 7.5 % (ref 4–15)
NEUTROPHILS # BLD AUTO: 2.1 K/UL (ref 1.8–7.7)
NEUTROPHILS NFR BLD: 69.4 % (ref 38–73)
NRBC BLD-RTO: 0 /100 WBC
PLATELET # BLD AUTO: 54 K/UL (ref 150–450)
PMV BLD AUTO: 9.4 FL (ref 9.2–12.9)
POTASSIUM SERPL-SCNC: 4.2 MMOL/L (ref 3.5–5.1)
PROT SERPL-MCNC: 6.6 G/DL (ref 6–8.4)
RBC # BLD AUTO: 3.52 M/UL (ref 4–5.4)
SATURATED IRON: 25 % (ref 20–50)
SODIUM SERPL-SCNC: 140 MMOL/L (ref 136–145)
TOTAL IRON BINDING CAPACITY: 380 UG/DL (ref 250–450)
TRANSFERRIN SERPL-MCNC: 257 MG/DL (ref 200–375)
WBC # BLD AUTO: 2.95 K/UL (ref 3.9–12.7)

## 2024-12-16 PROCEDURE — 85025 COMPLETE CBC W/AUTO DIFF WBC: CPT | Performed by: NURSE PRACTITIONER

## 2024-12-16 PROCEDURE — 1159F MED LIST DOCD IN RCRD: CPT | Mod: CPTII,S$GLB,, | Performed by: STUDENT IN AN ORGANIZED HEALTH CARE EDUCATION/TRAINING PROGRAM

## 2024-12-16 PROCEDURE — 99385 PREV VISIT NEW AGE 18-39: CPT | Mod: 25,S$GLB,, | Performed by: STUDENT IN AN ORGANIZED HEALTH CARE EDUCATION/TRAINING PROGRAM

## 2024-12-16 PROCEDURE — 36415 COLL VENOUS BLD VENIPUNCTURE: CPT | Performed by: INTERNAL MEDICINE

## 2024-12-16 PROCEDURE — 3044F HG A1C LEVEL LT 7.0%: CPT | Mod: CPTII,S$GLB,, | Performed by: STUDENT IN AN ORGANIZED HEALTH CARE EDUCATION/TRAINING PROGRAM

## 2024-12-16 PROCEDURE — 82784 ASSAY IGA/IGD/IGG/IGM EACH: CPT | Performed by: INTERNAL MEDICINE

## 2024-12-16 PROCEDURE — 3078F DIAST BP <80 MM HG: CPT | Mod: CPTII,S$GLB,, | Performed by: STUDENT IN AN ORGANIZED HEALTH CARE EDUCATION/TRAINING PROGRAM

## 2024-12-16 PROCEDURE — 99999 PR PBB SHADOW E&M-EST. PATIENT-LVL IV: CPT | Mod: PBBFAC,,, | Performed by: STUDENT IN AN ORGANIZED HEALTH CARE EDUCATION/TRAINING PROGRAM

## 2024-12-16 PROCEDURE — 82728 ASSAY OF FERRITIN: CPT | Performed by: INTERNAL MEDICINE

## 2024-12-16 PROCEDURE — 90471 IMMUNIZATION ADMIN: CPT | Mod: S$GLB,,, | Performed by: STUDENT IN AN ORGANIZED HEALTH CARE EDUCATION/TRAINING PROGRAM

## 2024-12-16 PROCEDURE — 3074F SYST BP LT 130 MM HG: CPT | Mod: CPTII,S$GLB,, | Performed by: STUDENT IN AN ORGANIZED HEALTH CARE EDUCATION/TRAINING PROGRAM

## 2024-12-16 PROCEDURE — 80053 COMPREHEN METABOLIC PANEL: CPT | Performed by: NURSE PRACTITIONER

## 2024-12-16 PROCEDURE — 3008F BODY MASS INDEX DOCD: CPT | Mod: CPTII,S$GLB,, | Performed by: STUDENT IN AN ORGANIZED HEALTH CARE EDUCATION/TRAINING PROGRAM

## 2024-12-16 PROCEDURE — 90677 PCV20 VACCINE IM: CPT | Mod: S$GLB,,, | Performed by: STUDENT IN AN ORGANIZED HEALTH CARE EDUCATION/TRAINING PROGRAM

## 2024-12-16 PROCEDURE — 83540 ASSAY OF IRON: CPT | Performed by: INTERNAL MEDICINE

## 2024-12-16 RX ORDER — ASPIRIN/CALCIUM CARB/MAGNESIUM 325 MG
TABLET ORAL
COMMUNITY

## 2024-12-16 RX ORDER — FLUOXETINE HYDROCHLORIDE 40 MG/1
40 CAPSULE ORAL
COMMUNITY
Start: 2024-12-03

## 2024-12-16 RX ORDER — BUPROPION HYDROCHLORIDE 150 MG/1
150 TABLET ORAL EVERY MORNING
COMMUNITY
Start: 2024-12-04

## 2024-12-16 NOTE — PROGRESS NOTES
TWO PATIENT IDENTIFIERS VERIFIED.  ALLERGIES VERIFIED.     After obtaining verbal consent from the patient, and per orders of Dr. COTE, injection of  PNEUMOCOCCAL 20-VALENT CONJUGATE VACCINE PREVNAR 20 LOT [id number] EXP 2026 APR, given in the LEFT DELTOID by NATHAN REINA LPN. Patient tolerated well and adhesive bandage applied. Patient instructed to remain in clinic for 15 minutes afterwards, and to report any adverse reaction to me immediately.

## 2024-12-17 ENCOUNTER — TELEPHONE (OUTPATIENT)
Dept: HEPATOLOGY | Facility: CLINIC | Age: 34
End: 2024-12-17
Payer: COMMERCIAL

## 2024-12-17 DIAGNOSIS — Z79.60 LONG-TERM USE OF IMMUNOSUPPRESSANT MEDICATION: ICD-10-CM

## 2024-12-17 DIAGNOSIS — K75.4 AUTOIMMUNE HEPATITIS: Primary | ICD-10-CM

## 2024-12-17 DIAGNOSIS — K74.01 EARLY HEPATIC FIBROSIS: ICD-10-CM

## 2024-12-17 DIAGNOSIS — Z79.899 IMMUNOSUPPRESSION DUE TO DRUG THERAPY: ICD-10-CM

## 2024-12-17 DIAGNOSIS — D69.6 THROMBOCYTOPENIA: ICD-10-CM

## 2024-12-17 DIAGNOSIS — R74.8 ELEVATED LIVER ENZYMES: ICD-10-CM

## 2024-12-17 DIAGNOSIS — D84.821 IMMUNOSUPPRESSION DUE TO DRUG THERAPY: ICD-10-CM

## 2024-12-17 NOTE — TELEPHONE ENCOUNTER
Patient was called and labs in 3 months have been scheduled. Patient agreed to time, date, and location scheduled.    Rula Jenkins MA    ----- Message from Kelly Claire NP sent at 12/17/2024  9:33 AM CST -----  Please contact patient to arrange f/u labs in 3 months. Thanks!

## 2024-12-18 NOTE — PROGRESS NOTES
[facility]  Subjective:     Patient ID: Eliseo Ybarra is a 34 y.o. female.  History of Present Illness    CHIEF COMPLAINT:  Patient presents today for excessive daytime sleepiness.    SLEEP CONCERNS:  She reports excessive daytime sleepiness despite getting 8-9 hours of quality sleep per night. This fatigue has been present for about 6 years, coinciding with her sobriety. 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 She consumes coffee in the morning and sometimes after lunch to manage energy levels. She reports snoring with pauses between snores and is interested in sleep apnea evaluation.    PSYCHIATRIC HISTORY:  She is established with psychiatry for depression and currently takes Wellbutrin 450 mg and fluoxetine 40 mg daily. She expresses reluctance to take stimulant medications due to history of addiction.    SUBSTANCE USE HISTORY:  She has maintained sobriety for 6 years. Her previous substance use included alcohol (primary), cocaine, cannabis, and psychedelics.    MEDICAL HISTORY:  She has autoimmune hepatitis managed with budesonide 9 mg daily and azathioprine 100 mg daily. She has been evaluated by hematology for normocytic anemia and thrombocytopenia. Labs in September showed normal MCV and borderline low blood cell count.    GYNECOLOGIC HISTORY:  She reports heavy menstrual periods since IUD placement, though bleeding has decreased over time.    LABS:  TSH is normal. A1c is 4.5. Cholesterol levels are excellent.       Current Outpatient Medications   Medication Instructions    azaTHIOprine (IMURAN) 100 mg, Oral, Daily    budesonide (ENTOCORT EC) 9 mg, Oral, Daily    buPROPion (WELLBUTRIN XL) 300 mg, Daily    buPROPion (WELLBUTRIN XL) 150 mg, Every morning    copper intrauterine device (PARAGARD) 380 square mm IUD 1 Intra Uterine Device, Once    FLUoxetine 40 mg    fluticasone propionate (FLONASE)  "50 mcg, Each Nostril, Daily    nicotine polacrilex (NICORETTE) 4 MG Lozg      Objective:      Body mass index is 21.92 kg/m².  Vitals:    12/16/24 1358   BP: 97/60   Pulse: 84   SpO2: 98%   Height: 5' 7.01" (1.702 m)   PainSc: 0-No pain     Physical Exam   Physical Exam    General: No acute distress. Normal appearance.  Head: Normocephalic.  Eyes: Extraocular movements intact.  Cardiovascular: Normal rate and rhythm. No murmur heard.  Lungs: Pulmonary effort is normal. Normal breath sounds. No wheezing. No rales.  Abdomen: Flat.  Musculoskeletal: No edema lower extremities.  Skin: Warm. Dry.  Neurological: Alert.  Psychiatric: Normal mood. Normal behavior.  Neck: No thyromegaly. No thyroid nodules.       Assessment:       1. Suspected sleep apnea    2. Excessive daytime sleepiness    3. Snoring    4. Autoimmune hepatitis    5. Depression, unspecified depression type        Plan:   Assessment & Plan    IMPRESSION:  Seen today to establish care and to discuss daytime sleepiness. She has chronic autoimmune hepatitis for which she takes budesonide and azathioprine. She is established with hepatology. She has also seen hematology for normocytic anemia and thrombocytopenia. She has a few labs which are outstanding and she would like to do them today. She is established with psychiatry for depression. Her primary complaint today was excessive sleepiness. She gets good quality sleep; however, she does report snoring and is interested in being evaluated for sleep apnea. Referral placed.  due for a pneumonia shot today. Otherwise, follow up in one year.    FOLLOW-UP:  Follow up in 1 year for routine check-up.  Contact the office if any new concerns arise before next appointment.         Suspected sleep apnea  -     Ambulatory referral/consult to Sleep Disorders; Future; Expected date: 12/23/2024    Excessive daytime sleepiness  -     Ambulatory referral/consult to Sleep Disorders; Future; Expected date: " 12/23/2024    Snoring    Autoimmune hepatitis  -     pneumoc 20-terrance conj-dip cr(PF) (PREVNAR-20 (PF)) injection Syrg 0.5 mL    Depression, unspecified depression type: continue current regimen and psych follow up         All of your core healthy metrics are met.    Follow up in about 1 year (around 12/16/2025). or sooner prn (as needed)          Bruce Jernigan  [facility]  [address] [address]  Phone [phone]  Fax [phone]    Part of this note is dictated using the [employer] Fluency Direct word recognition program. It may contain word recognition mistakes or wrong word substitutions (commonly he/she and is/was substitutions) that were missed on review.    Part of this note was generated with the assistance of ambient listening technology. Verbal consent was obtained by the patient and accompanying visitor(s) for the recording of patient appointment to facilitate this note. I attest to having reviewed and edited the generated note for accuracy, though some syntax or spelling errors may persist. Please contact the author of this note for any clarification.

## 2024-12-27 ENCOUNTER — TELEPHONE (OUTPATIENT)
Dept: ORTHOPEDICS | Facility: CLINIC | Age: 34
End: 2024-12-27
Payer: COMMERCIAL

## 2025-01-07 ENCOUNTER — TELEPHONE (OUTPATIENT)
Dept: HEMATOLOGY/ONCOLOGY | Facility: CLINIC | Age: 35
End: 2025-01-07
Payer: COMMERCIAL

## 2025-01-07 NOTE — TELEPHONE ENCOUNTER
Attempted to call pt's phone number but not in service. Left pt's mother voicemail about appointment change to 1/8 at 9:30 AM. Left call back number.

## 2025-01-08 ENCOUNTER — LAB VISIT (OUTPATIENT)
Dept: LAB | Facility: HOSPITAL | Age: 35
End: 2025-01-08
Attending: INTERNAL MEDICINE
Payer: COMMERCIAL

## 2025-01-08 ENCOUNTER — OFFICE VISIT (OUTPATIENT)
Dept: HEMATOLOGY/ONCOLOGY | Facility: CLINIC | Age: 35
End: 2025-01-08
Payer: COMMERCIAL

## 2025-01-08 VITALS
HEART RATE: 84 BPM | HEIGHT: 67 IN | SYSTOLIC BLOOD PRESSURE: 105 MMHG | BODY MASS INDEX: 21.92 KG/M2 | TEMPERATURE: 98 F | RESPIRATION RATE: 17 BRPM | DIASTOLIC BLOOD PRESSURE: 69 MMHG | OXYGEN SATURATION: 99 %

## 2025-01-08 DIAGNOSIS — D69.6 THROMBOCYTOPENIA: ICD-10-CM

## 2025-01-08 DIAGNOSIS — D69.6 THROMBOCYTOPENIA: Primary | ICD-10-CM

## 2025-01-08 DIAGNOSIS — K75.4 AUTOIMMUNE HEPATITIS: ICD-10-CM

## 2025-01-08 DIAGNOSIS — D84.821 IMMUNOSUPPRESSION DUE TO DRUG THERAPY: ICD-10-CM

## 2025-01-08 DIAGNOSIS — Z79.899 IMMUNOSUPPRESSION DUE TO DRUG THERAPY: ICD-10-CM

## 2025-01-08 LAB
BASOPHILS # BLD AUTO: 0.02 K/UL (ref 0–0.2)
BASOPHILS NFR BLD: 0.5 % (ref 0–1.9)
DIFFERENTIAL METHOD BLD: ABNORMAL
EOSINOPHIL # BLD AUTO: 0.1 K/UL (ref 0–0.5)
EOSINOPHIL NFR BLD: 1.9 % (ref 0–8)
ERYTHROCYTE [DISTWIDTH] IN BLOOD BY AUTOMATED COUNT: 17 % (ref 11.5–14.5)
HCT VFR BLD AUTO: 33.3 % (ref 37–48.5)
HGB BLD-MCNC: 10.8 G/DL (ref 12–16)
IMM GRANULOCYTES # BLD AUTO: 0.02 K/UL (ref 0–0.04)
IMM GRANULOCYTES NFR BLD AUTO: 0.5 % (ref 0–0.5)
LYMPHOCYTES # BLD AUTO: 0.5 K/UL (ref 1–4.8)
LYMPHOCYTES NFR BLD: 13.6 % (ref 18–48)
MCH RBC QN AUTO: 31.4 PG (ref 27–31)
MCHC RBC AUTO-ENTMCNC: 32.4 G/DL (ref 32–36)
MCV RBC AUTO: 97 FL (ref 82–98)
MONOCYTES # BLD AUTO: 0.2 K/UL (ref 0.3–1)
MONOCYTES NFR BLD: 5.9 % (ref 4–15)
NEUTROPHILS # BLD AUTO: 2.9 K/UL (ref 1.8–7.7)
NEUTROPHILS NFR BLD: 77.6 % (ref 38–73)
NRBC BLD-RTO: 0 /100 WBC
PLATELET # BLD AUTO: 68 K/UL (ref 150–450)
PMV BLD AUTO: 9.9 FL (ref 9.2–12.9)
RBC # BLD AUTO: 3.44 M/UL (ref 4–5.4)
WBC # BLD AUTO: 3.74 K/UL (ref 3.9–12.7)

## 2025-01-08 PROCEDURE — 3074F SYST BP LT 130 MM HG: CPT | Mod: CPTII,S$GLB,, | Performed by: INTERNAL MEDICINE

## 2025-01-08 PROCEDURE — 36415 COLL VENOUS BLD VENIPUNCTURE: CPT | Performed by: INTERNAL MEDICINE

## 2025-01-08 PROCEDURE — 99999 PR PBB SHADOW E&M-EST. PATIENT-LVL III: CPT | Mod: PBBFAC,,, | Performed by: INTERNAL MEDICINE

## 2025-01-08 PROCEDURE — 3008F BODY MASS INDEX DOCD: CPT | Mod: CPTII,S$GLB,, | Performed by: INTERNAL MEDICINE

## 2025-01-08 PROCEDURE — 85025 COMPLETE CBC W/AUTO DIFF WBC: CPT | Performed by: INTERNAL MEDICINE

## 2025-01-08 PROCEDURE — 99215 OFFICE O/P EST HI 40 MIN: CPT | Mod: S$GLB,,, | Performed by: INTERNAL MEDICINE

## 2025-01-08 PROCEDURE — 3078F DIAST BP <80 MM HG: CPT | Mod: CPTII,S$GLB,, | Performed by: INTERNAL MEDICINE

## 2025-01-08 NOTE — PROGRESS NOTES
Hematology and Medical Oncology   Follow Up     01/08/2025    Reason For Referral: Thrombocytopenia      History of Present Ilness:   Eliseo Ybarra (Eliseo) is a pleasant 34 y.o.female who presents virtually to discuss her platelet count. Recently diagnosed with auto immune hepatitis and following with Hepatology.     Reviewed labs dating back to 2015 where platelets have consistently been in the 150-80 range. Denies signs of bruising or bleeding.       Interval History:  Ms. Ybarra is doing well. Autoimmune hepatitis is under control. Has been able to maintain low liver function values off of steroids.      PAST MEDICAL HISTORY:   Past Medical History:   Diagnosis Date    Anxiety     Auto immune neutropenia     Autoimmune hepatitis     Depression        PAST SURGICAL HISTORY:   Past Surgical History:   Procedure Laterality Date    LIVER BIOPSY         PAST SOCIAL HISTORY:   reports that she has quit smoking. Her smoking use included cigarettes. She has quit using smokeless tobacco. She reports that she does not currently use alcohol. She reports that she does not currently use drugs after having used the following drugs: Marijuana.    FAMILY HISTORY:  Family History   Problem Relation Name Age of Onset    Depression Mother      Hyperlipidemia Mother      Melanoma Father      Alcohol abuse Father      Cancer Father      Depression Father      Depression Sister      Depression Brother      Depression Brother      Cancer Maternal Aunt      Depression Maternal Grandfather      ALS Maternal Grandfather      Vitiligo Maternal Grandfather      Depression Paternal Grandmother      Cancer Paternal Grandfather      Depression Paternal Grandfather      Breast cancer Neg Hx      Colon cancer Neg Hx      Ovarian cancer Neg Hx         CURRENT MEDICATIONS:   Current Outpatient Medications   Medication Sig    azaTHIOprine (IMURAN) 50 mg Tab Take 2 tablets (100 mg total) by mouth once daily.    budesonide (ENTOCORT EC) 3 mg  capsule Take 3 capsules (9 mg total) by mouth once daily.    buPROPion (WELLBUTRIN XL) 150 MG TB24 tablet Take 150 mg by mouth every morning.    buPROPion (WELLBUTRIN XL) 300 MG 24 hr tablet Take 300 mg by mouth once daily.    copper intrauterine device (PARAGARD) 380 square mm IUD 1 Intra Uterine Device by Intrauterine route once.    FLUoxetine 40 MG capsule Take 40 mg by mouth.    fluticasone propionate (FLONASE) 50 mcg/actuation nasal spray 1 spray (50 mcg total) by Each Nostril route once daily.    nicotine polacrilex (NICORETTE) 4 MG Lozg      No current facility-administered medications for this visit.     ALLERGIES:   Review of patient's allergies indicates:  No Known Allergies      Review of Systems:     Review of Systems   Constitutional:  Negative for appetite change, chills, diaphoresis, fatigue, fever and unexpected weight change.   HENT:   Negative for hearing loss, mouth sores, nosebleeds, sore throat, trouble swallowing and voice change.    Eyes:  Negative for eye problems and icterus.   Respiratory:  Negative for chest tightness, cough, hemoptysis, shortness of breath and wheezing.    Cardiovascular:  Negative for chest pain, leg swelling and palpitations.   Gastrointestinal:  Negative for abdominal distention, abdominal pain, blood in stool, diarrhea, nausea and vomiting.   Endocrine: Negative for hot flashes.   Genitourinary:  Negative for bladder incontinence, difficulty urinating, dysuria, frequency and hematuria.    Musculoskeletal:  Negative for arthralgias, back pain, flank pain, gait problem, myalgias, neck pain and neck stiffness.   Skin:  Negative for itching, rash and wound.   Neurological:  Negative for dizziness, extremity weakness, gait problem, headaches, numbness, seizures and speech difficulty.   Hematological:  Negative for adenopathy. Does not bruise/bleed easily.   Psychiatric/Behavioral:  Negative for confusion, depression and sleep disturbance. The patient is not  nervous/anxious.           Physical Exam:     Vitals:    01/08/25 0934   BP: 105/69   Pulse: 84   Resp: 17   Temp: 98.3 °F (36.8 °C)     Physical Exam  Constitutional:       General: She is not in acute distress.     Appearance: She is well-developed. She is not diaphoretic.   HENT:      Head: Normocephalic and atraumatic.      Mouth/Throat:      Pharynx: No oropharyngeal exudate.   Eyes:      Conjunctiva/sclera: Conjunctivae normal.      Pupils: Pupils are equal, round, and reactive to light.   Neck:      Thyroid: No thyromegaly.      Vascular: No JVD.      Trachea: No tracheal deviation.   Cardiovascular:      Rate and Rhythm: Normal rate and regular rhythm.      Heart sounds: Normal heart sounds. No murmur heard.     No friction rub.   Pulmonary:      Effort: Pulmonary effort is normal. No respiratory distress.      Breath sounds: Normal breath sounds. No stridor. No wheezing or rales.   Chest:      Chest wall: No tenderness.   Abdominal:      General: Bowel sounds are normal. There is no distension.      Palpations: Abdomen is soft.      Tenderness: There is no abdominal tenderness. There is no guarding or rebound.   Musculoskeletal:         General: No tenderness or deformity. Normal range of motion.      Cervical back: Normal range of motion and neck supple.   Skin:     General: Skin is warm and dry.      Capillary Refill: Capillary refill takes less than 2 seconds.      Coloration: Skin is not pale.      Findings: No erythema or rash.   Neurological:      Mental Status: She is alert and oriented to person, place, and time.      Cranial Nerves: No cranial nerve deficit.      Sensory: No sensory deficit.      Motor: No abnormal muscle tone.      Coordination: Coordination normal.      Deep Tendon Reflexes: Reflexes normal.   Psychiatric:         Behavior: Behavior normal.         Thought Content: Thought content normal.         Judgment: Judgment normal.           ECOG Performance Status: (foot note - ECOG PS  provided by Eastern Cooperative Oncology Group) 0 - Asymptomatic    Karnofsky Performance Score:  100%- Normal, No Complaints, No Evidence of Disease    Labs:   Lab Results   Component Value Date    WBC 3.74 (L) 01/08/2025    HGB 10.8 (L) 01/08/2025    HCT 33.3 (L) 01/08/2025    PLT 68 (L) 01/08/2025    CHOL 184 09/23/2024    TRIG 59 09/23/2024    HDL 62 09/23/2024    ALT 47 (H) 12/16/2024    AST 33 12/16/2024     12/16/2024    K 4.2 12/16/2024     12/16/2024    CREATININE 0.9 12/16/2024    BUN 12 12/16/2024    CO2 22 (L) 12/16/2024    TSH 3.307 09/23/2024    INR 1.1 11/16/2023    HGBA1C 4.5 09/23/2024       Imaging: Previous imaging has been reviewed     Assessment and Plan:     Ms. Ybarra is a pleasant 34 year old female with Auto Immune Hepatitis and mild/moderate thrombocytopenia.    Thrombocytopenia  --First seen on labs in 2015 and very stable since 2022  --Likely related to hepatic disease and splenic enlargement seen on ultrasound  --peripheral smear and blue top tube to assess platelet for fragmentation or clumping was unrevealing  --Counts remain stable in the 55-75k range  --Discussed safe perameters for surgery and when to present to hematology clinic  --Denies bleeding, bruising  or prolonged healing times  --Follow up to be determined by additional lab work    Auto Immune Hepatitis  --Follows with hepatology  --liver biopsy and imaging completed    40 minutes in total were spent on this encounter of which 30 minutes were spent face to face with the patient to discuss the disease, natural history, and possible treatment options. I have provided the patient with an opportunity to ask questions and have all questions answered to her satisfaction.       she will return to clinic in the event platelet count remains under 50k, but knows to call in the interim if symptoms change or should a problem arise.        Erika Jhaveri MD  Hematology and Medical Oncology  [facility]  [facility]      BMT Chart Routing      Follow up with physician No follow up needed.   Follow up with MICHAEL    Provider visit type    Infusion scheduling note    Injection scheduling note    Labs    Imaging    Pharmacy appointment    Other referrals

## 2025-01-29 ENCOUNTER — PATIENT MESSAGE (OUTPATIENT)
Dept: OBSTETRICS AND GYNECOLOGY | Facility: CLINIC | Age: 35
End: 2025-01-29
Payer: COMMERCIAL

## 2025-02-20 ENCOUNTER — OFFICE VISIT (OUTPATIENT)
Dept: SLEEP MEDICINE | Facility: CLINIC | Age: 35
End: 2025-02-20
Payer: COMMERCIAL

## 2025-02-20 DIAGNOSIS — R06.81 WITNESSED EPISODE OF APNEA: ICD-10-CM

## 2025-02-20 DIAGNOSIS — G47.10 HYPERSOMNOLENCE: Primary | ICD-10-CM

## 2025-02-20 DIAGNOSIS — R06.83 SNORING: ICD-10-CM

## 2025-02-20 DIAGNOSIS — R35.1 NOCTURIA: ICD-10-CM

## 2025-02-20 DIAGNOSIS — R29.818 SUSPECTED SLEEP APNEA: ICD-10-CM

## 2025-02-20 NOTE — PROGRESS NOTES
The chief complaint leading to consultation is: snoring     Visit type: audiovisual     The patient location is: [place]     25 minutes of total time spent on the encounter, which includes face to face time and non-face to face time preparing to see the patient (eg, review of tests), Obtaining and/or reviewing separately obtained history, Documenting clinical information in the electronic or other health record, Independently interpreting results (not separately reported) and communicating results to the patient/family/caregiver, or Care coordination (not separately reported).      Each patient to whom he or she provides medical services by telemedicine is:  (1) informed of the relationship between the physician and patient and the respective role of any other health care provider with respect to management of the patient; and (2) notified that he or she may decline to receive medical services by telemedicine and may withdraw from such care at any time.      Referred by Bruce Jernigan MD     NEW PATIENT VISIT    Eliseo Ybarra  is a pleasant 34 y.o. female  with PMH significant for utoimmune hepatitis, early hepatic fibrosis, thrombocytopenia, anxiety who presents for sleep evaluation following referral from PCP      C/o snoring, rare snoring arousals, witnessed apneas, poor disrupted sleep 2/2 to nocturia, un-refreshing sleep, and excessive daytime sleepiness and fatigue. 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 Currently sleeping most of her day right now. Never feels well rested no matter how much she sleeps. Denies drowsiness when driving or ever falling asleep behind the wheel. Denies H/H hallucinations, sleep paralysis, or cataplexy. Denies sleep walking, does endorse rare episodes of sleep talking. Denies dream enactment behavior. Denies sx of RLS. States PCP  recommended evaluation for MELISSA, which is why she presents today.      SLEEP SCHEDULE   Environment    Bed Time 10-11PM   Sleep Latency Within 15mins   Arousals 1-2 x for nocturia   Nocturia 1-2   Back to sleep Within 15mins   Wake time 7-8AM when in school (just graduated)  10-11AM (currently unemployed)   Naps 1-2 naps daily (45mins-4hrs)   Work        Past Medical History:   Diagnosis Date    Anxiety     Auto immune neutropenia     Autoimmune hepatitis     Depression      Problem List[1]  Current Medications[2]     There were no vitals filed for this visit.    Physical Exam:    GEN:   Well-appearing  Psych:  Appropriate affect, demonstrates insight  SKIN:  No rash on the face or bridge of the nose      LABS:   Lab Results   Component Value Date    HGB 10.8 (L) 2025    CO2 22 (L) 2024         RECORDS REVIEWED:    No previous sleep study    ASSESSMENT    Central Sleepiness Scale:  Sitting and readin  Watching TV:    3  Passenger in a car x 1 hr:  3  Sitting quietly after lunch:  2  Lying down to rest in PM:  3  Sitting, inactive in public:  1  Sitting+ talking to someone:  0  Stopped in traffic:   0  Total        PROBLEM DESCRIPTION/ Sx on Presentation  STATUS   Sx MELISSA   + snoring, + snoring arousals, + witnessed apneas  + wakes feeling un-refreshed  + wakes with dry mouth  Denies morning headaches  Denies known family hx of MELISSA  New   Daytime Sx   + sleepiness when inactive   Sleeping 10-12hours  1-2 naps daily (45mins-4hrs)  Denies drowsiness when driving  ESS  on intake    H/H hallucinations: denies  Sleep paralysis:  denies  Cataplexy: denies  Naps: not refreshing  New   Insomnia   Trouble falling asleep: within 15mins  Arousals:         1-2 for nocturia  Hard to get back to sleep?: within 15mins    Prior pertinent medications:  Current pertinent medications:   New   Nocturia   x 1-2 per sleep period  New   Other issues:       PLAN      -recommend sleep testing  -HST  ordered  -discussed trial therapy if MELISSA present and the patient is open to a trial of CPAP therapy  -consider assessment for central hypersomnias if MELISSA workup negative or if persistent sleepiness and pro-longed total sleep time persists once MELISSA dx made/treated  -discussed MELISSA and PAP with patient in detail, including possible complications of untreated MELISSA like heart attack/stroke  -advised on strict driving precautions; advised never to drive drowsy     Advised on plan of care. Answered all patient questions. Patient verbalized understanding and voiced agreement with plan of care.     RTC if dx of MELISSA made and CPAP ordered, will need follow up 31-90 days after receiving machine for compliance       The patient was given open opportunity to ask questions and/or express concerns about treatment plan. All questions/concerns were discussed.     Two patient identifiers used prior to evaluation.                  [1]   Patient Active Problem List  Diagnosis    Autoimmune hepatitis    Anorexia nervosa with bulimia    Long-term use of immunosuppressant medication    Elevated liver enzymes    Early hepatic fibrosis    Immunosuppression due to drug therapy    Thrombocytopenia   [2]   Current Outpatient Medications:     azaTHIOprine (IMURAN) 50 mg Tab, Take 2 tablets (100 mg total) by mouth once daily., Disp: 180 tablet, Rfl: 3    budesonide (ENTOCORT EC) 3 mg capsule, Take 3 capsules (9 mg total) by mouth once daily., Disp: 270 capsule, Rfl: 3    buPROPion (WELLBUTRIN XL) 150 MG TB24 tablet, Take 150 mg by mouth every morning., Disp: , Rfl:     buPROPion (WELLBUTRIN XL) 300 MG 24 hr tablet, Take 300 mg by mouth once daily., Disp: , Rfl:     copper intrauterine device (PARAGARD) 380 square mm IUD, 1 Intra Uterine Device by Intrauterine route once., Disp: , Rfl:     FLUoxetine 40 MG capsule, Take 40 mg by mouth., Disp: , Rfl:     fluticasone propionate (FLONASE) 50 mcg/actuation nasal spray, 1 spray (50 mcg total) by Each  Nostril route once daily., Disp: 1 each, Rfl: 0    nicotine polacrilex (NICORETTE) 4 MG Travis, , Disp: , Rfl:

## 2025-03-17 ENCOUNTER — LAB VISIT (OUTPATIENT)
Dept: LAB | Facility: OTHER | Age: 35
End: 2025-03-17
Attending: NURSE PRACTITIONER
Payer: COMMERCIAL

## 2025-03-17 ENCOUNTER — RESULTS FOLLOW-UP (OUTPATIENT)
Dept: HEPATOLOGY | Facility: CLINIC | Age: 35
End: 2025-03-17

## 2025-03-17 DIAGNOSIS — R74.8 ELEVATED LIVER ENZYMES: ICD-10-CM

## 2025-03-17 DIAGNOSIS — D69.6 THROMBOCYTOPENIA: ICD-10-CM

## 2025-03-17 DIAGNOSIS — D84.821 IMMUNOSUPPRESSION DUE TO DRUG THERAPY: ICD-10-CM

## 2025-03-17 DIAGNOSIS — Z79.60 LONG-TERM USE OF IMMUNOSUPPRESSANT MEDICATION: ICD-10-CM

## 2025-03-17 DIAGNOSIS — Z79.899 IMMUNOSUPPRESSION DUE TO DRUG THERAPY: ICD-10-CM

## 2025-03-17 DIAGNOSIS — K74.01 EARLY HEPATIC FIBROSIS: ICD-10-CM

## 2025-03-17 DIAGNOSIS — K75.4 AUTOIMMUNE HEPATITIS: ICD-10-CM

## 2025-03-17 LAB
ALBUMIN SERPL BCP-MCNC: 4.2 G/DL (ref 3.5–5.2)
ALP SERPL-CCNC: 96 U/L (ref 40–150)
ALT SERPL W/O P-5'-P-CCNC: 111 U/L (ref 10–44)
ANION GAP SERPL CALC-SCNC: 9 MMOL/L (ref 8–16)
AST SERPL-CCNC: 73 U/L (ref 10–40)
BASOPHILS # BLD AUTO: 0.02 K/UL (ref 0–0.2)
BASOPHILS NFR BLD: 0.5 % (ref 0–1.9)
BILIRUB SERPL-MCNC: 0.6 MG/DL (ref 0.1–1)
BUN SERPL-MCNC: 12 MG/DL (ref 6–20)
CALCIUM SERPL-MCNC: 9.3 MG/DL (ref 8.7–10.5)
CHLORIDE SERPL-SCNC: 106 MMOL/L (ref 95–110)
CO2 SERPL-SCNC: 21 MMOL/L (ref 23–29)
CREAT SERPL-MCNC: 0.8 MG/DL (ref 0.5–1.4)
DIFFERENTIAL METHOD BLD: ABNORMAL
EOSINOPHIL # BLD AUTO: 0.1 K/UL (ref 0–0.5)
EOSINOPHIL NFR BLD: 2.5 % (ref 0–8)
ERYTHROCYTE [DISTWIDTH] IN BLOOD BY AUTOMATED COUNT: 14 % (ref 11.5–14.5)
EST. GFR  (NO RACE VARIABLE): >60 ML/MIN/1.73 M^2
GLUCOSE SERPL-MCNC: 88 MG/DL (ref 70–110)
HCT VFR BLD AUTO: 32.4 % (ref 37–48.5)
HGB BLD-MCNC: 11 G/DL (ref 12–16)
IMM GRANULOCYTES # BLD AUTO: 0.05 K/UL (ref 0–0.04)
IMM GRANULOCYTES NFR BLD AUTO: 1.1 % (ref 0–0.5)
LYMPHOCYTES # BLD AUTO: 0.7 K/UL (ref 1–4.8)
LYMPHOCYTES NFR BLD: 16.9 % (ref 18–48)
MCH RBC QN AUTO: 31.9 PG (ref 27–31)
MCHC RBC AUTO-ENTMCNC: 34 G/DL (ref 32–36)
MCV RBC AUTO: 94 FL (ref 82–98)
MONOCYTES # BLD AUTO: 0.2 K/UL (ref 0.3–1)
MONOCYTES NFR BLD: 5 % (ref 4–15)
NEUTROPHILS # BLD AUTO: 3.2 K/UL (ref 1.8–7.7)
NEUTROPHILS NFR BLD: 74 % (ref 38–73)
NRBC BLD-RTO: 0 /100 WBC
PLATELET # BLD AUTO: 68 K/UL (ref 150–450)
PMV BLD AUTO: 10.2 FL (ref 9.2–12.9)
POTASSIUM SERPL-SCNC: 4.1 MMOL/L (ref 3.5–5.1)
PROT SERPL-MCNC: 7.3 G/DL (ref 6–8.4)
RBC # BLD AUTO: 3.45 M/UL (ref 4–5.4)
SODIUM SERPL-SCNC: 136 MMOL/L (ref 136–145)
WBC # BLD AUTO: 4.38 K/UL (ref 3.9–12.7)

## 2025-03-17 PROCEDURE — 85025 COMPLETE CBC W/AUTO DIFF WBC: CPT | Performed by: NURSE PRACTITIONER

## 2025-03-17 PROCEDURE — 36415 COLL VENOUS BLD VENIPUNCTURE: CPT | Performed by: NURSE PRACTITIONER

## 2025-03-17 PROCEDURE — 80053 COMPREHEN METABOLIC PANEL: CPT | Performed by: NURSE PRACTITIONER

## 2025-03-21 DIAGNOSIS — D69.6 THROMBOCYTOPENIA: ICD-10-CM

## 2025-03-21 DIAGNOSIS — Z79.60 LONG-TERM USE OF IMMUNOSUPPRESSANT MEDICATION: ICD-10-CM

## 2025-03-21 DIAGNOSIS — R74.8 ELEVATED LIVER ENZYMES: ICD-10-CM

## 2025-03-21 DIAGNOSIS — K74.01 EARLY HEPATIC FIBROSIS: ICD-10-CM

## 2025-03-21 DIAGNOSIS — Z79.899 IMMUNOSUPPRESSION DUE TO DRUG THERAPY: ICD-10-CM

## 2025-03-21 DIAGNOSIS — K75.4 AUTOIMMUNE HEPATITIS: Primary | ICD-10-CM

## 2025-03-21 DIAGNOSIS — D84.821 IMMUNOSUPPRESSION DUE TO DRUG THERAPY: ICD-10-CM

## 2025-03-22 ENCOUNTER — PATIENT MESSAGE (OUTPATIENT)
Dept: OBSTETRICS AND GYNECOLOGY | Facility: CLINIC | Age: 35
End: 2025-03-22
Payer: COMMERCIAL

## 2025-03-22 DIAGNOSIS — Z30.430 ENCOUNTER FOR IUD INSERTION: Primary | ICD-10-CM

## 2025-06-10 DIAGNOSIS — K75.4 AUTOIMMUNE HEPATITIS: ICD-10-CM

## 2025-06-10 DIAGNOSIS — Z79.60 LONG-TERM USE OF IMMUNOSUPPRESSANT MEDICATION: ICD-10-CM

## 2025-06-11 ENCOUNTER — PATIENT MESSAGE (OUTPATIENT)
Dept: HEPATOLOGY | Facility: CLINIC | Age: 35
End: 2025-06-11
Payer: COMMERCIAL

## 2025-06-11 DIAGNOSIS — Z79.899 IMMUNOSUPPRESSION DUE TO DRUG THERAPY: ICD-10-CM

## 2025-06-11 DIAGNOSIS — D69.6 THROMBOCYTOPENIA: ICD-10-CM

## 2025-06-11 DIAGNOSIS — R74.8 ELEVATED LIVER ENZYMES: ICD-10-CM

## 2025-06-11 DIAGNOSIS — K75.4 AUTOIMMUNE HEPATITIS: Primary | ICD-10-CM

## 2025-06-11 DIAGNOSIS — D84.821 IMMUNOSUPPRESSION DUE TO DRUG THERAPY: ICD-10-CM

## 2025-06-11 DIAGNOSIS — K74.01 EARLY HEPATIC FIBROSIS: ICD-10-CM

## 2025-06-11 RX ORDER — BUDESONIDE 3 MG/1
9 CAPSULE, COATED PELLETS ORAL DAILY
Qty: 270 CAPSULE | Refills: 3 | Status: SHIPPED | OUTPATIENT
Start: 2025-06-11

## 2025-06-25 DIAGNOSIS — Z79.60 LONG-TERM USE OF IMMUNOSUPPRESSANT MEDICATION: ICD-10-CM

## 2025-06-25 DIAGNOSIS — K75.4 AUTOIMMUNE HEPATITIS: Primary | ICD-10-CM

## 2025-06-25 RX ORDER — AZATHIOPRINE 50 MG/1
100 TABLET ORAL DAILY
Qty: 180 TABLET | Refills: 3 | Status: SHIPPED | OUTPATIENT
Start: 2025-06-25

## 2025-06-30 ENCOUNTER — HOSPITAL ENCOUNTER (OUTPATIENT)
Dept: RADIOLOGY | Facility: HOSPITAL | Age: 35
Discharge: HOME | End: 2025-06-30
Attending: NURSE PRACTITIONER
Payer: COMMERCIAL

## 2025-06-30 ENCOUNTER — RESULTS FOLLOW-UP (OUTPATIENT)
Dept: HEPATOLOGY | Facility: CLINIC | Age: 35
End: 2025-06-30

## 2025-06-30 DIAGNOSIS — R74.8 ELEVATED LIVER ENZYMES: ICD-10-CM

## 2025-06-30 DIAGNOSIS — D84.821 IMMUNOSUPPRESSION DUE TO DRUG THERAPY: ICD-10-CM

## 2025-06-30 DIAGNOSIS — D69.6 THROMBOCYTOPENIA: ICD-10-CM

## 2025-06-30 DIAGNOSIS — K74.01 EARLY HEPATIC FIBROSIS: ICD-10-CM

## 2025-06-30 DIAGNOSIS — Z79.899 IMMUNOSUPPRESSION DUE TO DRUG THERAPY: ICD-10-CM

## 2025-06-30 DIAGNOSIS — K75.4 AUTOIMMUNE HEPATITIS: ICD-10-CM

## 2025-06-30 PROCEDURE — 76700 US EXAM ABDOM COMPLETE: CPT | Mod: TC

## 2025-06-30 PROCEDURE — 76700 US EXAM ABDOM COMPLETE: CPT | Mod: 26,,, | Performed by: RADIOLOGY

## 2025-07-09 ENCOUNTER — LAB VISIT (OUTPATIENT)
Dept: LAB | Facility: HOSPITAL | Age: 35
End: 2025-07-09
Payer: COMMERCIAL

## 2025-07-09 DIAGNOSIS — D84.821 IMMUNOSUPPRESSION DUE TO DRUG THERAPY: ICD-10-CM

## 2025-07-09 DIAGNOSIS — K74.01 EARLY HEPATIC FIBROSIS: ICD-10-CM

## 2025-07-09 DIAGNOSIS — K75.4 AUTOIMMUNE HEPATITIS: ICD-10-CM

## 2025-07-09 DIAGNOSIS — D69.6 THROMBOCYTOPENIA: ICD-10-CM

## 2025-07-09 DIAGNOSIS — Z79.899 IMMUNOSUPPRESSION DUE TO DRUG THERAPY: ICD-10-CM

## 2025-07-09 DIAGNOSIS — R74.8 ELEVATED LIVER ENZYMES: ICD-10-CM

## 2025-07-09 LAB
ABSOLUTE EOSINOPHIL (OHS): 0.04 K/UL
ABSOLUTE MONOCYTE (OHS): 0.26 K/UL (ref 0.3–1)
ABSOLUTE NEUTROPHIL COUNT (OHS): 3.94 K/UL (ref 1.8–7.7)
AFP SERPL-MCNC: 3.4 NG/ML
ALBUMIN SERPL BCP-MCNC: 4 G/DL (ref 3.5–5.2)
ALP SERPL-CCNC: 69 UNIT/L (ref 40–150)
ALT SERPL W/O P-5'-P-CCNC: 72 UNIT/L (ref 10–44)
ANION GAP (OHS): 6 MMOL/L (ref 8–16)
AST SERPL-CCNC: 65 UNIT/L (ref 11–45)
BASOPHILS # BLD AUTO: 0.01 K/UL
BASOPHILS NFR BLD AUTO: 0.2 %
BILIRUB SERPL-MCNC: 0.6 MG/DL (ref 0.1–1)
BUN SERPL-MCNC: 7 MG/DL (ref 6–20)
CALCIUM SERPL-MCNC: 8.7 MG/DL (ref 8.7–10.5)
CHLORIDE SERPL-SCNC: 110 MMOL/L (ref 95–110)
CO2 SERPL-SCNC: 23 MMOL/L (ref 23–29)
CREAT SERPL-MCNC: 0.7 MG/DL (ref 0.5–1.4)
ERYTHROCYTE [DISTWIDTH] IN BLOOD BY AUTOMATED COUNT: 15.5 % (ref 11.5–14.5)
GFR SERPLBLD CREATININE-BSD FMLA CKD-EPI: >60 ML/MIN/1.73/M2
GLUCOSE SERPL-MCNC: 101 MG/DL (ref 70–110)
HCT VFR BLD AUTO: 32.2 % (ref 37–48.5)
HGB BLD-MCNC: 10.9 GM/DL (ref 12–16)
IMM GRANULOCYTES # BLD AUTO: 0.03 K/UL (ref 0–0.04)
IMM GRANULOCYTES NFR BLD AUTO: 0.6 % (ref 0–0.5)
INR PPP: 1.1 (ref 0.8–1.2)
LYMPHOCYTES # BLD AUTO: 0.54 K/UL (ref 1–4.8)
MCH RBC QN AUTO: 31.7 PG (ref 27–31)
MCHC RBC AUTO-ENTMCNC: 33.9 G/DL (ref 32–36)
MCV RBC AUTO: 94 FL (ref 82–98)
NUCLEATED RBC (/100WBC) (OHS): 0 /100 WBC
PLATELET # BLD AUTO: 53 K/UL (ref 150–450)
PMV BLD AUTO: 10.4 FL (ref 9.2–12.9)
POTASSIUM SERPL-SCNC: 4.3 MMOL/L (ref 3.5–5.1)
PROT SERPL-MCNC: 6.4 GM/DL (ref 6–8.4)
PROTHROMBIN TIME: 11.7 SECONDS (ref 9–12.5)
RBC # BLD AUTO: 3.44 M/UL (ref 4–5.4)
RELATIVE EOSINOPHIL (OHS): 0.8 %
RELATIVE LYMPHOCYTE (OHS): 11.2 % (ref 18–48)
RELATIVE MONOCYTE (OHS): 5.4 % (ref 4–15)
RELATIVE NEUTROPHIL (OHS): 81.8 % (ref 38–73)
SODIUM SERPL-SCNC: 139 MMOL/L (ref 136–145)
WBC # BLD AUTO: 4.82 K/UL (ref 3.9–12.7)

## 2025-07-09 PROCEDURE — 85025 COMPLETE CBC W/AUTO DIFF WBC: CPT

## 2025-07-09 PROCEDURE — 36415 COLL VENOUS BLD VENIPUNCTURE: CPT

## 2025-07-09 PROCEDURE — 85610 PROTHROMBIN TIME: CPT

## 2025-07-09 PROCEDURE — 80299 QUANTITATIVE ASSAY DRUG: CPT

## 2025-07-09 PROCEDURE — 82105 ALPHA-FETOPROTEIN SERUM: CPT

## 2025-07-09 PROCEDURE — 82435 ASSAY OF BLOOD CHLORIDE: CPT

## 2025-07-10 ENCOUNTER — PATIENT MESSAGE (OUTPATIENT)
Dept: HEPATOLOGY | Facility: CLINIC | Age: 35
End: 2025-07-10
Payer: COMMERCIAL

## 2025-07-10 ENCOUNTER — OFFICE VISIT (OUTPATIENT)
Dept: HEPATOLOGY | Facility: CLINIC | Age: 35
End: 2025-07-10
Payer: COMMERCIAL

## 2025-07-10 DIAGNOSIS — R93.89 ABNORMAL FINDING OF DIAGNOSTIC IMAGING: ICD-10-CM

## 2025-07-10 DIAGNOSIS — D69.6 THROMBOCYTOPENIA: ICD-10-CM

## 2025-07-10 DIAGNOSIS — R74.8 ELEVATED LIVER ENZYMES: ICD-10-CM

## 2025-07-10 DIAGNOSIS — R16.1 SPLENOMEGALY: ICD-10-CM

## 2025-07-10 DIAGNOSIS — K75.4 AUTOIMMUNE HEPATITIS: Primary | ICD-10-CM

## 2025-07-10 DIAGNOSIS — K74.00 LIVER FIBROSIS: ICD-10-CM

## 2025-07-10 DIAGNOSIS — D84.821 IMMUNOSUPPRESSION DUE TO DRUG THERAPY: ICD-10-CM

## 2025-07-10 DIAGNOSIS — Z79.899 IMMUNOSUPPRESSION DUE TO DRUG THERAPY: ICD-10-CM

## 2025-07-10 PROCEDURE — 1159F MED LIST DOCD IN RCRD: CPT | Mod: CPTII,95,, | Performed by: NURSE PRACTITIONER

## 2025-07-10 PROCEDURE — 1160F RVW MEDS BY RX/DR IN RCRD: CPT | Mod: CPTII,95,, | Performed by: NURSE PRACTITIONER

## 2025-07-10 PROCEDURE — 98006 SYNCH AUDIO-VIDEO EST MOD 30: CPT | Mod: 95,,, | Performed by: NURSE PRACTITIONER

## 2025-07-10 NOTE — PROGRESS NOTES
The patient location is: [place]  The chief complaint leading to consultation is: Autoimmune Hepatitis    Visit type: audiovisual    30 minutes of total time spent on the encounter, which includes face to face time and non-face to face time preparing to see the patient (eg, review of tests), Obtaining and/or reviewing separately obtained history, Documenting clinical information in the electronic or other health record, Independently interpreting results (not separately reported) and communicating results to the patient/family/caregiver, or Care coordination (not separately reported).     Each patient to whom he or she provides medical services by telemedicine is:  (1) informed of the relationship between the physician and patient and the respective role of any other health care provider with respect to management of the patient; and (2) notified that he or she may decline to receive medical services by telemedicine and may withdraw from such care at any time.    Notes:     [facility] VISIT ESTABLISHED PT NOTE    REFERRING PROVIDER:  No ref. provider found    CHIEF COMPLAINT: Autoimmune Hepatitis    HPI: This is a 34 y.o. White female with PMH noted below, presenting for follow up for autoimmune hepatitis. She was previously followed by a Hepatologist in [place], and was last seen in clinic by myself in 9/2024. She was diagnosed with autoimmune hepatitis at age 7, previously treated with Azathioprine and steroids. She is now on Budesonide 9 mg daily, in addition to Azathioprine.    Family history is significant for Father with a history of alcohol abuse. She also has a history of alcohol abuse, and illicit drug use; sober since 2018. She denies current illicit drug use. HCV negative on prior labs in 2015. She is immune to Hepatitis A and B, through prior vaccination.     Liver biopsy in 2006 (no evidence of significant fibrosis, per patient report). Fibroscan to restage her liver disease in  9/2022 was suggestive of no significant hepatic steatosis with F0-F1 fibrosis and a low likelihood of cirrhosis.     Abdominal US in 9/2022 showed no focal liver lesions, with mild splenomegaly (13 cm). US also showed small gallbladder cholesterol crystals, & borderline dilatation of the CBD (7 mm).     Due to chronic thrombocytopenia, she underwent liver biopsy in 12/2023 for restaging purposes. Pathology results were consistent with treated/partially treated AIH with stage 2 (moderate) fibrosis, as below:    Final Pathologic Diagnosis Liver, random (needle biopsy):  Mild portal lymphoplasmacytic inflammation  Minimal interface or lobular activity.  See comment  Mild ductular proliferation with willie biliary neutrophils.  See comment  Scattered portal and lobular macrophages, may represent response to prior injury  Periportal fibrosis with incomplete bridging (stage 2 of 4)  No significant steatosis  No hepatocyte iron    Comment:  Specimen is adequate for evaluation.  Sections show patchy mild portal lymphoplasmacytic inflammation with minimal interface and lobular activity. There are scattered portal and lobular macrophages which may represent response to prior injury.  The findings are non-specific, but given the patient's clinical history of autoimmune hepatitis (AIH), the findings are compatible with treated/partially treated autoimmune hepatitis. There is a mild ductulare reaction with peribiliary neutrophils.    This may represent reaction to prior interface activity, duct issue/obstruction, infection, drug effect.    The trichrome highlights variable fibrosis involvement: Periportal fibrosis, incomplete bridging and focal/rare nodule formation.  Overall, the findings are most compatible with stage 2 of 4 fibrosis.       Labs drawn in 3/2025 again showed elevation in ALT/AST, but synthetic function was normal. She notes that she did miss some doses of her medication, due to insurance issues (difficulty paying  co-pay, due to unstable job market). She is back to taking treatment as prescribed, and her liver enzymes are improved on most recent labs drawn this month (ALT 72, AST 65).    AFP tumor marker to screen for liver cancer is normal. Abdominal US last month shows worsening splenomegaly, with splenic collateral vessels noted, as below:    US Abdomen Complete  Narrative: EXAMINATION:  US ABDOMEN COMPLETE    CLINICAL HISTORY:  Autoimmune hepatitis    TECHNIQUE:  Complete abdominal ultrasound (including pancreas, aorta, liver, gallbladder, common bile duct, IVC, kidneys, and spleen) was performed.    COMPARISON:  Ultrasound 09/06/2022.    FINDINGS:  Pancreas: The visualized portions of pancreas appear normal.    Aorta: No aneurysm.    Liver: 15.3 cm, normal in size. Heterogeneous parenchymal echotexture.  No focal lesions.    Gallbladder: No gallstones.  There is mild gallbladder wall thickening which is nonspecific.  Trace pericholecystic fluid.    Biliary system: 5 mm common bile duct.  No intrahepatic ductal dilatation.    Inferior vena cava: Normal in appearance.    Right kidney: 11.8 cm. No hydronephrosis.    Left kidney: 12.2 cm. No hydronephrosis.    Spleen: 15.8 x 6.6 cm.  Enlarged with homogeneous echotexture.  Splenic collateral vessels are noted.    Miscellaneous: No ascites.  Impression: Heterogeneous liver echotexture, suggestive of underlying chronic liver disease.  No liver lesions.    Splenomegaly and collateral vessels, suggestive of portal hypertension.    Nonspecific gallbladder wall thickening, may relate to chronic liver dysfunction.    Electronically signed by resident: Matt Boyer  Date:    06/30/2025  Time:    09:41    Electronically signed by: Marco Walters Jr  Date:    06/30/2025  Time:    10:07    Given these findings, we discussed obtaining an EGD for further evaluation of portal hypertension, including esophageal and gastric varices. The patient is agreeable to proceed.    She is well  appearing, and has no signs or symptoms of hepatic decompensation including jaundice, dark urine, abdominal distention, lower extremity, hematemesis, melena, or periods of confusion suggestive of hepatic encephalopathy. No abnormal skin rashes, or generalized joint pain.     Review of patient's allergies indicates:  No Known Allergies    Current Outpatient Medications on File Prior to Visit   Medication Sig Dispense Refill    azaTHIOprine (IMURAN) 50 mg Tab Take 2 tablets (100 mg total) by mouth once daily. 180 tablet 3    budesonide (ENTOCORT EC) 3 mg capsule Take 3 capsules (9 mg total) by mouth once daily. 270 capsule 3    buPROPion (WELLBUTRIN XL) 150 MG TB24 tablet Take 150 mg by mouth every morning.      buPROPion (WELLBUTRIN XL) 300 MG 24 hr tablet Take 300 mg by mouth once daily.      FLUoxetine 40 MG capsule Take 40 mg by mouth.      fluticasone propionate (FLONASE) 50 mcg/actuation nasal spray 1 spray (50 mcg total) by Each Nostril route once daily. 1 each 0    nicotine polacrilex (NICORETTE) 4 MG Lozg       [DISCONTINUED] copper intrauterine device (PARAGARD) 380 square mm IUD 1 Intra Uterine Device by Intrauterine route once.       No current facility-administered medications on file prior to visit.     PMHX:  has a past medical history of Anxiety, Auto immune neutropenia, Autoimmune hepatitis, and Depression.    PSHX:  has a past surgical history that includes Liver biopsy.    FAMILY HISTORY: Negative for liver disease, reviewed in [facility]    SOCIAL HISTORY:   Social History     Tobacco Use   Smoking Status Former    Types: Cigarettes   Smokeless Tobacco Former     Social History     Substance and Sexual Activity   Alcohol Use Not Currently    Comment: History of alcohol abuse, sober since 2018.     Social History     Substance and Sexual Activity   Drug Use Not Currently    Types: Marijuana     ROS:   GENERAL: Denies fever, chills, weight loss/gain, fatigue  HEENT: Denies headaches, dizziness,  vision/hearing changes  CARDIOVASCULAR: Denies chest pain, palpitations, or edema  RESPIRATORY: Denies dyspnea, cough  GI: Denies abdominal pain, rectal bleeding, nausea, vomiting. No change in bowel pattern or color  : Denies dysuria, hematuria   SKIN: Denies rash, itching   NEURO: Denies confusion, memory loss, or mood changes  PSYCH: Denies depression or anxiety  HEME/LYMPH: Denies easy bruising or bleeding    PHYSICAL EXAM:   Friendly White female, in no acute distress; alert and oriented to person, place and time.  VITALS: There were no vitals taken for this visit.   HENT: Normocephalic, without obvious abnormality.   EYES: Sclerae anicteric.   NECK: No obvious masses.  CARDIOVASCULAR: MARC.  RESPIRATORY: Normal respiratory effort.  GI: MARC.  EXTREMITIES: MARC.  SKIN:  No jaundice.   NEURO:  No asterixis.  PSYCH:  Memory intact. Thought and speech pattern appropriate. Behavior normal. No depression or anxiety noted.    DIAGNOSTIC STUDIES:    FIBROSCAN 9/6/2022:    Findings  Median liver stiffness score:  5.3  CAP Reading: dB/m:  234     IQR/med %:  8  Interpretation  Fibrosis interpretation is based on medial liver stiffness - Kilopascal (kPa).     Fibrosis Stage:  F 0-1  Steatosis interpretation is based on controlled attenuation parameter - (dB/m).     Steatosis Grade:  S1    US ABDOMEN COMPLETE 9/6/2022:    FINDINGS:    Pancreas: The visualized portions of pancreas appear normal.     Aorta: No aneurysm.     Liver:  Normal in size, measuring 14.9 cm.  Heterogenous/coarse parenchymal echotexture. No focal lesions.  HRI measures 1.4.     Gallbladder: Small echogenic foci, likely cholesterol crystals.  No shadowing stones.  No wall thickening or pericholecystic fluid.  Negative sonographic Gray's sign.     Biliary system: Borderline dilated measuring 7 mm at maximum diameter.  No intrahepatic ductal dilatation.     Inferior vena cava: Normal in appearance.     Right kidney: 10.7 cm. No stones, solid mass, or  hydronephrosis.     Left kidney: 11.3 cm.  No stones, solid mass, or hydronephrosis.     Spleen:  Enlarged measuring 13 x 5 cm.  Homogeneous architecture.     Miscellaneous: No ascites.     Impression:     Heterogenous hepatic echotexture in patient with history of autoimmune hepatitis.     Small gallbladder cholesterol crystals.  No shadowing stones.     Borderline dilatation of the common bile duct.     Splenomegaly.    LIVER PATHOLOGY 12/12/2023:    Final Pathologic Diagnosis Liver, random (needle biopsy):  Mild portal lymphoplasmacytic inflammation  Minimal interface or lobular activity.  See comment  Mild ductular proliferation with willie biliary neutrophils.  See comment  Scattered portal and lobular macrophages, may represent response to prior injury  Periportal fibrosis with incomplete bridging (stage 2 of 4)  No significant steatosis  No hepatocyte iron    Comment:  Specimen is adequate for evaluation.  Sections show patchy mild portal lymphoplasmacytic inflammation with minimal interface and lobular activity. There are scattered portal and lobular macrophages which may represent response to prior injury.   The findings are non-specific, but given the patient's clinical history of autoimmune hepatitis (AIH), the findings are compatible with treated/partially treated autoimmune hepatitis. There is a mild ductulare reaction with peribiliary neutrophils.    This may represent reaction to prior interface activity, duct issue/obstruction, infection, drug effect.    The trichrome highlights variable fibrosis involvement: Periportal fibrosis, incomplete bridging and focal/rare nodule formation.  Overall, the findings are most compatible with stage 2 of 4 fibrosis.       US Abdomen Complete  Narrative: EXAMINATION:  US ABDOMEN COMPLETE    CLINICAL HISTORY:  Autoimmune hepatitis    TECHNIQUE:  Complete abdominal ultrasound (including pancreas, aorta, liver, gallbladder, common bile duct, IVC, kidneys, and spleen) was  performed.    COMPARISON:  Ultrasound 09/06/2022.    FINDINGS:  Pancreas: The visualized portions of pancreas appear normal.    Aorta: No aneurysm.    Liver: 15.3 cm, normal in size. Heterogeneous parenchymal echotexture.  No focal lesions.    Gallbladder: No gallstones.  There is mild gallbladder wall thickening which is nonspecific.  Trace pericholecystic fluid.    Biliary system: 5 mm common bile duct.  No intrahepatic ductal dilatation.    Inferior vena cava: Normal in appearance.    Right kidney: 11.8 cm. No hydronephrosis.    Left kidney: 12.2 cm. No hydronephrosis.    Spleen: 15.8 x 6.6 cm.  Enlarged with homogeneous echotexture.  Splenic collateral vessels are noted.    Miscellaneous: No ascites.  Impression: Heterogeneous liver echotexture, suggestive of underlying chronic liver disease.  No liver lesions.    Splenomegaly and collateral vessels, suggestive of portal hypertension.    Nonspecific gallbladder wall thickening, may relate to chronic liver dysfunction.    Electronically signed by resident: Matt Boyer  Date:    06/30/2025  Time:    09:41    Electronically signed by: Marco Walters Jr  Date:    06/30/2025  Time:    10:07      ASSESSMENT & PLAN:  34 y.o. White female with:      1. Autoimmune hepatitis  Ambulatory referral/consult to Endo Procedure     Comprehensive Metabolic Panel    CBC Auto Differential      2. Liver fibrosis  Ambulatory referral/consult to Endo Procedure     Comprehensive Metabolic Panel    CBC Auto Differential      3. Elevated liver enzymes  Comprehensive Metabolic Panel    CBC Auto Differential      4. Thrombocytopenia  Ambulatory referral/consult to Endo Procedure     Comprehensive Metabolic Panel    CBC Auto Differential      5. Splenomegaly  Comprehensive Metabolic Panel    CBC Auto Differential      6. Abnormal finding of diagnostic imaging  Comprehensive Metabolic Panel    CBC Auto Differential      7. Immunosuppression due to drug therapy   Comprehensive Metabolic Panel    CBC Auto Differential        - Schedule EGD for further evaluation of splenomegaly/chronic thrombocytopenia.  - Continue Azathioprine 100 mg PO daily without missing any doses.  - Continue Budesonide 9 mg daily without missing any doses.   - Recommend labs every 3 months to monitor blood counts, liver and kidney function tests.  - Continue to remain abstinent from alcohol and illicit drugs.   - Recommend annual dermatology visits for skin cancer screenings.  - Return to clinic to be determined by EGD and lab results.    Thank you for allowing me to participate in the care of Eliseo Ybarra       Hepatology Nurse Practitioner  [facility]    CC'ed note to:   No ref. provider found  Bruce Jernigan MD

## 2025-07-12 LAB
6-TGN ENTSUB RBC: 128 PMOL/8X10(8)RBC (ref 235–450)
6MMP ENTSUB RBC: 894 PMOL/8X10(8)RBC

## 2025-07-17 ENCOUNTER — TELEPHONE (OUTPATIENT)
Dept: ENDOSCOPY | Facility: HOSPITAL | Age: 35
End: 2025-07-17
Payer: COMMERCIAL

## 2025-07-17 VITALS — HEIGHT: 67 IN | WEIGHT: 140 LBS | BODY MASS INDEX: 21.97 KG/M2

## 2025-07-17 DIAGNOSIS — K75.4 AUTOIMMUNE HEPATITIS: Primary | ICD-10-CM

## 2025-07-17 DIAGNOSIS — D69.6 THROMBOCYTOPENIA: ICD-10-CM

## 2025-07-17 DIAGNOSIS — K74.00 LIVER FIBROSIS: ICD-10-CM

## 2025-07-17 NOTE — TELEPHONE ENCOUNTER
Patient is scheduled for a Upper Endoscopy (EGD) on 7/31/25 with LADY Bennett. [facility] see [facility] tab.

## 2025-07-24 ENCOUNTER — PATIENT MESSAGE (OUTPATIENT)
Dept: ENDOSCOPY | Facility: HOSPITAL | Age: 35
End: 2025-07-24
Payer: COMMERCIAL

## 2025-07-24 ENCOUNTER — TELEPHONE (OUTPATIENT)
Dept: ENDOSCOPY | Facility: HOSPITAL | Age: 35
End: 2025-07-24
Payer: COMMERCIAL

## 2025-07-28 ENCOUNTER — ANESTHESIA EVENT (OUTPATIENT)
Dept: ENDOSCOPY | Facility: HOSPITAL | Age: 35
End: 2025-07-28
Payer: COMMERCIAL

## 2025-07-28 ENCOUNTER — HOSPITAL ENCOUNTER (OUTPATIENT)
Facility: HOSPITAL | Age: 35
Discharge: HOME | End: 2025-07-28
Attending: INTERNAL MEDICINE | Admitting: INTERNAL MEDICINE
Payer: COMMERCIAL

## 2025-07-28 ENCOUNTER — ANESTHESIA (OUTPATIENT)
Dept: ENDOSCOPY | Facility: HOSPITAL | Age: 35
End: 2025-07-28
Payer: COMMERCIAL

## 2025-07-28 VITALS
WEIGHT: 140 LBS | OXYGEN SATURATION: 100 % | TEMPERATURE: 98 F | DIASTOLIC BLOOD PRESSURE: 75 MMHG | RESPIRATION RATE: 16 BRPM | HEART RATE: 88 BPM | HEIGHT: 67 IN | BODY MASS INDEX: 21.97 KG/M2 | SYSTOLIC BLOOD PRESSURE: 113 MMHG

## 2025-07-28 DIAGNOSIS — D69.6 THROMBOCYTOPENIA: ICD-10-CM

## 2025-07-28 DIAGNOSIS — K76.6 PORTAL HYPERTENSION: ICD-10-CM

## 2025-07-28 DIAGNOSIS — R16.1 SPLENOMEGALY: ICD-10-CM

## 2025-07-28 DIAGNOSIS — K74.00 LIVER FIBROSIS: Primary | ICD-10-CM

## 2025-07-28 LAB
B-HCG UR QL: NEGATIVE
CTP QC/QA: YES

## 2025-07-28 PROCEDURE — 63600175 PHARM REV CODE 636 W HCPCS: Performed by: NURSE ANESTHETIST, CERTIFIED REGISTERED

## 2025-07-28 PROCEDURE — 81025 URINE PREGNANCY TEST: CPT | Performed by: INTERNAL MEDICINE

## 2025-07-28 PROCEDURE — 43235 EGD DIAGNOSTIC BRUSH WASH: CPT | Performed by: INTERNAL MEDICINE

## 2025-07-28 PROCEDURE — 43235 EGD DIAGNOSTIC BRUSH WASH: CPT | Mod: ,,, | Performed by: INTERNAL MEDICINE

## 2025-07-28 PROCEDURE — 37000009 HC ANESTHESIA EA ADD 15 MINS: Performed by: INTERNAL MEDICINE

## 2025-07-28 PROCEDURE — 37000008 HC ANESTHESIA 1ST 15 MINUTES: Performed by: INTERNAL MEDICINE

## 2025-07-28 RX ORDER — SODIUM CHLORIDE 9 MG/ML
INJECTION, SOLUTION INTRAVENOUS CONTINUOUS
Status: DISCONTINUED | OUTPATIENT
Start: 2025-07-28 | End: 2025-07-28 | Stop reason: HOSPADM

## 2025-07-28 RX ORDER — LIDOCAINE HYDROCHLORIDE 20 MG/ML
INJECTION INTRAVENOUS
Status: DISCONTINUED | OUTPATIENT
Start: 2025-07-28 | End: 2025-07-28

## 2025-07-28 RX ORDER — PROPOFOL 10 MG/ML
VIAL (ML) INTRAVENOUS CONTINUOUS PRN
Status: DISCONTINUED | OUTPATIENT
Start: 2025-07-28 | End: 2025-07-28

## 2025-07-28 RX ADMIN — PROPOFOL 70 MG: 10 INJECTION, EMULSION INTRAVENOUS at 03:07

## 2025-07-28 RX ADMIN — PROPOFOL 200 MCG/KG/MIN: 10 INJECTION, EMULSION INTRAVENOUS at 03:07

## 2025-07-28 RX ADMIN — LIDOCAINE HYDROCHLORIDE 100 MG: 20 INJECTION INTRAVENOUS at 03:07

## 2025-07-28 RX ADMIN — GLYCOPYRROLATE 0.02 MG: 0.2 INJECTION, SOLUTION INTRAMUSCULAR; INTRAVENOUS at 03:07

## 2025-07-28 NOTE — PROVATION PATIENT INSTRUCTIONS
Discharge Summary/Instructions after an Endoscopic Procedure  Patient Name: Eliseo Kuhn  Patient MRN: [id number]  Patient YOB: 1990 Monday, July 28, 2025  Chanel Pendleton MD  Dear patient,  As a result of recent federal legislation (The Federal Cures Act), you may   receive lab or pathology results from your procedure in your [facility]   account before your physician is able to contact you. Your physician or   their representative will relay the results to you with their   recommendations at their soonest availability.  Thank you,  RESTRICTIONS:  During your procedure today, you received medications for sedation.  These   medications may affect your judgment, balance and coordination.  Therefore,   for 24 hours, you have the following restrictions:   - DO NOT drive a car, operate machinery, make legal/financial decisions,   sign important papers or drink alcohol.    ACTIVITY:  Today: no heavy lifting, straining or running due to procedural   sedation/anesthesia.  The following day: return to full activity including work.  DIET:  Eat and drink normally unless instructed otherwise.     TREATMENT FOR COMMON SIDE EFFECTS:  - Mild abdominal pain, nausea, belching, bloating or excessive gas:  rest,   eat lightly and use a heating pad.  - Sore Throat: treat with throat lozenges and/or gargle with warm salt   water.  - Because air was used during the procedure, expelling large amounts of air   from your rectum or belching is normal.  - If a bowel prep was taken, you may not have a bowel movement for 1-3 days.    This is normal.  SYMPTOMS TO WATCH FOR AND REPORT TO YOUR PHYSICIAN:  1. Abdominal pain or bloating, other than gas cramps.  2. Chest pain.  3. Back pain.  4. Signs of infection such as: chills or fever occurring within 24 hours   after the procedure.  5. Rectal bleeding, which would show as bright red, maroon, or black stools.   (A tablespoon of blood from the rectum is not serious, especially if    hemorrhoids are present.)  6. Vomiting.  7. Weakness or dizziness.  GO DIRECTLY TO THE NEAREST EMERGENCY ROOM IF YOU HAVE ANY OF THE FOLLOWING:      Difficulty breathing              Chills and/or fever over 101 F   Persistent vomiting and/or vomiting blood   Severe abdominal pain   Severe chest pain   Black, tarry stools   Bleeding- more than one tablespoon   Any other symptom or condition that you feel may need urgent attention  Your doctor recommends these additional instructions:  If any biopsies were taken, your doctors clinic will contact you in 1 to 2   weeks with any results.  - Give a beta blocker with dosage titrated by the heart rate.   - Continue present medications.   - Resume previous diet.   - Discharge patient to home (with escort).   - The patient will be observed post-procedure, until all discharge criteria   are met.   - Patient has a contact number available for emergencies.  The signs and   symptoms of potential delayed complications were discussed with the   patient.  Return to normal activities tomorrow.  Written discharge   instructions were provided to the patient.  For questions, problems or results please call your physician - Chanel Pendleton MD at Work:  [phone].  [facility], EMERGENCY ROOM PHONE NUMBER: [phone]  IF A COMPLICATION OR EMERGENCY SITUATION ARISES AND YOU ARE UNABLE TO REACH   YOUR PHYSICIAN - GO DIRECTLY TO THE EMERGENCY ROOM.  MD Chanel Chiang MD  7/28/2025 3:18:55 PM  This report has been verified and signed electronically.  Dear patient,  As a result of recent federal legislation (The Federal Cures Act), you may   receive lab or pathology results from your procedure in your [facility]   account before your physician is able to contact you. Your physician or   their representative will relay the results to you with their   recommendations at their soonest availability.  Thank you,  PROVATION

## 2025-07-28 NOTE — H&P
Short Stay Endoscopy History and Physical    PCP - Bruce Jernigan MD     Procedure - EGD  ASA - per anesthesia  Mallampati - per anesthesia  History of Anesthesia problems - no  Family history Anesthesia problems -  no   Plan of anesthesia - per anesthesia    HPI:  This is a 35 y.o. female here for evaluation of: varices    Denies N/V, dysphagia, odynophagia, abd pain, diarrhea, gross GI bleeding.    Denies current anticoagulation.    Denies prior abdominal surgeries.    No prior EGD in our system.    ROS:  Constitutional: No fevers, chills, No weight loss  CV: No chest pain  Pulm: No cough, No shortness of breath  Ophtho: No vision changes  GI: see HPI  Derm: No rash    Medical History:  has a past medical history of Anxiety, Auto immune neutropenia, Autoimmune hepatitis, and Depression.    Surgical History:  has a past surgical history that includes Liver biopsy.    Family History: family history includes ALS in her maternal grandfather; Alcohol abuse in her father; Cancer in her father, maternal aunt, and paternal grandfather; Depression in her brother, brother, father, maternal grandfather, mother, paternal grandfather, paternal grandmother, and sister; Hyperlipidemia in her mother; Melanoma in her father; Vitiligo in her maternal grandfather.    Social History:  reports that she has quit smoking. Her smoking use included cigarettes. She has quit using smokeless tobacco. She reports that she does not currently use alcohol. She reports that she does not currently use drugs after having used the following drugs: Marijuana.    Review of patient's allergies indicates:  No Known Allergies    Medications:   Prescriptions Prior to Admission[1]    Physical Exam:    Vital Signs:   Vitals:    07/28/25 1336   BP: 114/67   Pulse: 68   Resp: 16   Temp: 97.9 °F (36.6 °C)   Body mass index is 21.93 kg/m².    General Appearance: Well appearing in no acute distress  Eyes:    No scleral icterus  Lungs: Symmetric chest  excursions  Heart:  Regular rate  Abdomen: Soft, non tender, non distended   Extremities: No edema  Skin: No rash    Labs:  Lab Results   Component Value Date    WBC 4.82 07/09/2025    HGB 10.9 (L) 07/09/2025    HCT 32.2 (L) 07/09/2025    PLT 53 (L) 07/09/2025    CHOL 184 09/23/2024    TRIG 59 09/23/2024    HDL 62 09/23/2024    ALT 72 (H) 07/09/2025    AST 65 (H) 07/09/2025     07/09/2025    K 4.3 07/09/2025     07/09/2025    CREATININE 0.7 07/09/2025    BUN 7 07/09/2025    CO2 23 07/09/2025    TSH 3.307 09/23/2024    INR 1.1 07/09/2025    HGBA1C 4.5 09/23/2024       We discussed the risks of EGD, including bleeding, infections, and perforation requiring surgery.    The patient was consented for the procedure. Plan to proceed with EGD. All questions were answered.    Chanel Pendleton MD         [1]   Medications Prior to Admission   Medication Sig Dispense Refill Last Dose/Taking    azaTHIOprine (IMURAN) 50 mg Tab Take 2 tablets (100 mg total) by mouth once daily. 180 tablet 3 7/27/2025    budesonide (ENTOCORT EC) 3 mg capsule Take 3 capsules (9 mg total) by mouth once daily. 270 capsule 3 7/27/2025    buPROPion (WELLBUTRIN XL) 150 MG TB24 tablet Take 150 mg by mouth every morning.   7/27/2025    FLUoxetine 40 MG capsule Take 40 mg by mouth.   7/27/2025    fluticasone propionate (FLONASE) 50 mcg/actuation nasal spray 1 spray (50 mcg total) by Each Nostril route once daily. 1 each 0 Past Month    buPROPion (WELLBUTRIN XL) 300 MG 24 hr tablet Take 300 mg by mouth once daily.       nicotine polacrilex (NICORETTE) 4 MG [facility]

## 2025-07-28 NOTE — TRANSFER OF CARE
"Anesthesia Transfer of Care Note    Patient: Eliseo Ybarra    Procedure(s) Performed: Procedure(s) (LRB):  EGD (ESOPHAGOGASTRODUODENOSCOPY) (N/A)    Patient location: GI    Anesthesia Type: general    Transport from OR: Transported from OR on room air with adequate spontaneous ventilation    Post pain: adequate analgesia    Post assessment: no apparent anesthetic complications and tolerated procedure well    Post vital signs: stable    Level of consciousness: awake, alert and oriented    Nausea/Vomiting: no nausea/vomiting    Complications: none    Transfer of care protocol was followed      Last vitals: Visit Vitals  /67 (BP Location: Left arm)   Pulse 68   Temp 36.6 °C (97.9 °F) (Temporal)   Resp 16   Ht 5' 7" (1.702 m)   Wt 63.5 kg (140 lb)   SpO2 99%   Breastfeeding No   BMI 21.93 kg/m²     "

## 2025-07-28 NOTE — ANESTHESIA POSTPROCEDURE EVALUATION
Anesthesia Post Evaluation    Patient: Eliseo Ybarra    Procedure(s) Performed: Procedure(s) (LRB):  EGD (ESOPHAGOGASTRODUODENOSCOPY) (N/A)    Final Anesthesia Type: general      Patient location during evaluation: GI PACU  Patient participation: Yes- Able to Participate  Level of consciousness: awake and alert and oriented  Post-procedure vital signs: reviewed and stable  Pain management: adequate  Airway patency: patent    PONV status at discharge: No PONV  Anesthetic complications: no      Cardiovascular status: hemodynamically stable  Respiratory status: unassisted, spontaneous ventilation and room air  Hydration status: euvolemic  Follow-up not needed.              Vitals Value Taken Time   /75 07/28/25 15:50   Temp 36.6 °C (97.8 °F) 07/28/25 15:20   Pulse 88 07/28/25 15:50   Resp 16 07/28/25 15:50   SpO2 100 % 07/28/25 15:50         Event Time   Out of Recovery 15:52:51         Pain/Vicky Score: Vicky Score: 10 (7/28/2025  3:50 PM)

## 2025-07-28 NOTE — ANESTHESIA PREPROCEDURE EVALUATION
07/28/2025  Eliseo Ybarra is a 35 y.o., female.    Problem List[1]  Past Surgical History:   Procedure Laterality Date    LIVER BIOPSY       Past Medical History:   Diagnosis Date    Anxiety     Auto immune neutropenia     Autoimmune hepatitis     Depression            Pre-op Assessment    I have reviewed the Patient Summary Reports.       I have reviewed the Medications.     Review of Systems      Physical Exam  General: Well nourished, Alert and Oriented    Airway:  Mallampati: II / I  Mouth Opening: Normal  TM Distance: Normal  Tongue: Normal  Neck ROM: Normal ROM    Dental:  Intact        Anesthesia Plan  Type of Anesthesia, risks & benefits discussed:    Anesthesia Type: Gen Natural Airway  Intra-op Monitoring Plan: Standard ASA Monitors  Induction:  IV  Airway Plan: , Post-Induction  Informed Consent: Informed consent signed with the Patient and all parties understand the risks and agree with anesthesia plan.  All questions answered.   ASA Score: 2  Day of Surgery Review of History & Physical: I have interviewed and examined the patient. I have reviewed the patient's H&P dated: There are no significant changes.     Ready For Surgery From Anesthesia Perspective.     .           [1]   Patient Active Problem List  Diagnosis    Autoimmune hepatitis    Anorexia nervosa with bulimia    Long-term use of immunosuppressant medication    Elevated liver enzymes    Liver fibrosis    Immunosuppression due to drug therapy    Thrombocytopenia    Splenomegaly    Abnormal finding of diagnostic imaging

## 2025-08-01 ENCOUNTER — TELEPHONE (OUTPATIENT)
Dept: HEPATOLOGY | Facility: CLINIC | Age: 35
End: 2025-08-01
Payer: COMMERCIAL

## 2025-08-01 NOTE — TELEPHONE ENCOUNTER
Telephone call to patient to review recent EGD results, and plan of care. No answer at mobile # on file. General VMM left for patient, requesting a return call.        Hepatology Nurse Practitioner  [facility] [place] [facility] [place]

## 2025-08-04 ENCOUNTER — TELEPHONE (OUTPATIENT)
Dept: HEPATOLOGY | Facility: CLINIC | Age: 35
End: 2025-08-04
Payer: COMMERCIAL

## 2025-08-04 NOTE — TELEPHONE ENCOUNTER
Copied from CRM #[id number]. Topic: General Inquiry - Return Call  >> Aug 4, 2025 10:33 AM Gerardo wrote:    Returning a Missed Call     Caller: patient         Returning call to: provider      Caller can be reached @: [phone]       Nature of the call: EGD results

## 2025-08-06 ENCOUNTER — TELEPHONE (OUTPATIENT)
Dept: HEPATOLOGY | Facility: CLINIC | Age: 35
End: 2025-08-06
Payer: COMMERCIAL

## 2025-08-06 NOTE — TELEPHONE ENCOUNTER
Copied from CRM #[id number]. Topic: General Inquiry - Test Results  >> Aug 6, 2025 10:42 AM Fady wrote:  Consult/Advisory     Name Of Caller: Eliseo        Contact Preference: [phone]     Nature of call: Calling about the results of her Endoscopy

## 2025-08-07 ENCOUNTER — TELEPHONE (OUTPATIENT)
Dept: HEPATOLOGY | Facility: CLINIC | Age: 35
End: 2025-08-07
Payer: COMMERCIAL

## 2025-08-07 DIAGNOSIS — K76.6 PORTAL HYPERTENSION WITH ESOPHAGEAL VARICES: Primary | ICD-10-CM

## 2025-08-07 DIAGNOSIS — I85.00 PORTAL HYPERTENSION WITH ESOPHAGEAL VARICES: Primary | ICD-10-CM

## 2025-08-07 RX ORDER — CARVEDILOL 3.12 MG/1
3.12 TABLET ORAL 2 TIMES DAILY WITH MEALS
Qty: 60 TABLET | Refills: 6 | Status: SHIPPED | OUTPATIENT
Start: 2025-08-07

## 2025-08-07 NOTE — TELEPHONE ENCOUNTER
Telephone call to patient to review EGD results and plan of care.    Patient is in agreement with starting BB for portal HTN. RX sent to local pharmacy on file.    All questions answered to the patient's satisfaction.       Hepatology Nurse Practitioner  [facility] [place] [facility] [place]

## 2025-08-24 ENCOUNTER — PATIENT MESSAGE (OUTPATIENT)
Dept: OBSTETRICS AND GYNECOLOGY | Facility: CLINIC | Age: 35
End: 2025-08-24
Payer: COMMERCIAL